# Patient Record
Sex: FEMALE | NOT HISPANIC OR LATINO | Employment: OTHER | URBAN - METROPOLITAN AREA
[De-identification: names, ages, dates, MRNs, and addresses within clinical notes are randomized per-mention and may not be internally consistent; named-entity substitution may affect disease eponyms.]

---

## 2024-05-30 ENCOUNTER — LAB REQUISITION (OUTPATIENT)
Dept: LAB | Facility: CLINIC | Age: 84
End: 2024-05-30

## 2024-05-30 ENCOUNTER — DOCUMENTATION ONLY (OUTPATIENT)
Dept: GERIATRICS | Facility: CLINIC | Age: 84
End: 2024-05-30
Payer: COMMERCIAL

## 2024-05-30 DIAGNOSIS — Z11.1 ENCOUNTER FOR SCREENING FOR RESPIRATORY TUBERCULOSIS: ICD-10-CM

## 2024-05-31 ENCOUNTER — TRANSITIONAL CARE UNIT VISIT (OUTPATIENT)
Dept: GERIATRICS | Facility: CLINIC | Age: 84
End: 2024-05-31
Payer: COMMERCIAL

## 2024-05-31 VITALS
SYSTOLIC BLOOD PRESSURE: 110 MMHG | HEIGHT: 64 IN | TEMPERATURE: 97.9 F | WEIGHT: 105.8 LBS | HEART RATE: 64 BPM | OXYGEN SATURATION: 92 % | DIASTOLIC BLOOD PRESSURE: 64 MMHG | RESPIRATION RATE: 16 BRPM | BODY MASS INDEX: 18.06 KG/M2

## 2024-05-31 DIAGNOSIS — Z96.649 STATUS POST HIP HEMIARTHROPLASTY: ICD-10-CM

## 2024-05-31 DIAGNOSIS — S72.002D CLOSED FRACTURE OF NECK OF LEFT FEMUR WITH ROUTINE HEALING, SUBSEQUENT ENCOUNTER: Primary | ICD-10-CM

## 2024-05-31 DIAGNOSIS — F41.9 ANXIETY: ICD-10-CM

## 2024-05-31 DIAGNOSIS — K59.01 SLOW TRANSIT CONSTIPATION: ICD-10-CM

## 2024-05-31 DIAGNOSIS — F17.200 TOBACCO DEPENDENCE: ICD-10-CM

## 2024-05-31 DIAGNOSIS — R53.81 PHYSICAL DECONDITIONING: ICD-10-CM
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
## 2024-05-31 DIAGNOSIS — R41.89 COGNITIVE IMPAIRMENT: ICD-10-CM

## 2024-05-31 DIAGNOSIS — I10 ESSENTIAL HYPERTENSION: ICD-10-CM

## 2024-05-31 DIAGNOSIS — S22.039D CLOSED FRACTURE OF THIRD THORACIC VERTEBRA WITH ROUTINE HEALING, UNSPECIFIED FRACTURE MORPHOLOGY, SUBSEQUENT ENCOUNTER: ICD-10-CM

## 2024-05-31 DIAGNOSIS — D62 ANEMIA DUE TO BLOOD LOSS, ACUTE: ICD-10-CM

## 2024-05-31 DIAGNOSIS — M80.00XD OSTEOPOROSIS WITH CURRENT PATHOLOGICAL FRACTURE WITH ROUTINE HEALING, UNSPECIFIED OSTEOPOROSIS TYPE, SUBSEQUENT ENCOUNTER: ICD-10-CM

## 2024-05-31 PROBLEM — F41.1 GAD (GENERALIZED ANXIETY DISORDER): Status: ACTIVE | Noted: 2024-05-25

## 2024-05-31 PROBLEM — S72.012A: Status: ACTIVE | Noted: 2024-05-25

## 2024-05-31 PROBLEM — Z85.828 HISTORY OF NONMELANOMA SKIN CANCER: Status: ACTIVE | Noted: 2022-08-30

## 2024-05-31 PROBLEM — K59.09 OTHER CONSTIPATION: Status: ACTIVE | Noted: 2023-08-27
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
## 2024-05-31 PROBLEM — R55 SYNCOPE AND COLLAPSE: Status: ACTIVE | Noted: 2023-08-27

## 2024-05-31 PROCEDURE — 36415 COLL VENOUS BLD VENIPUNCTURE: CPT | Performed by: NURSE PRACTITIONER

## 2024-05-31 PROCEDURE — 99310 SBSQ NF CARE HIGH MDM 45: CPT | Performed by: NURSE PRACTITIONER

## 2024-05-31 PROCEDURE — P9604 ONE-WAY ALLOW PRORATED TRIP: HCPCS | Performed by: NURSE PRACTITIONER

## 2024-05-31 PROCEDURE — 86481 TB AG RESPONSE T-CELL SUSP: CPT | Performed by: NURSE PRACTITIONER

## 2024-05-31 RX ORDER — MIRTAZAPINE 15 MG/1
1 TABLET, FILM COATED ORAL AT BEDTIME
COMMUNITY
Start: 2023-06-25

## 2024-05-31 RX ORDER — AMOXICILLIN 250 MG
1 CAPSULE ORAL 2 TIMES DAILY PRN
COMMUNITY
Start: 2024-05-29

## 2024-05-31 RX ORDER — NICOTINE 21 MG/24HR
1 PATCH, TRANSDERMAL 24 HOURS TRANSDERMAL EVERY 24 HOURS
COMMUNITY
Start: 2024-05-30

## 2024-05-31 RX ORDER — ACETAMINOPHEN 325 MG/1
650 TABLET ORAL 3 TIMES DAILY
COMMUNITY
Start: 2024-05-29

## 2024-05-31 RX ORDER — CLONIDINE HYDROCHLORIDE 0.1 MG/1
0.1 TABLET ORAL 2 TIMES DAILY
COMMUNITY
Start: 2024-05-30 | End: 2024-06-04

## 2024-05-31 RX ORDER — CITALOPRAM HYDROBROMIDE 20 MG/1
1 TABLET ORAL DAILY
COMMUNITY
Start: 2023-09-26

## 2024-05-31 RX ORDER — ENOXAPARIN SODIUM 100 MG/ML
40 INJECTION SUBCUTANEOUS EVERY 24 HOURS
COMMUNITY
Start: 2024-05-30 | End: 2024-06-14

## 2024-05-31 NOTE — PROGRESS NOTES
[facility] GERIATRICS    PRIMARY CARE PROVIDER AND CLINIC:  No primary care provider on file., No primary physician on file.  Chief Complaint   Patient presents with    Hospital F/U      [place] Medical Record Number:  [id number]  Place of Service where encounter took place:  [facility]  ([facility]) [[id number]]    Becky England  is a 84 year old  (1940), admitted to the above facility from  [facility]. Hospital stay 5/25/24 through 5/30/24..   HPI:    PMH significant for hypertension, dyslipidemia, anxiety, cognitive impairment, tobacco dependence, osteoporosis    Summary of recent hospitalization:  Patient was hospitalized at [facility] from 5/25/2024 to 5/30/2024 for femur fracture status left hemiarthroplasty on 5/26/2024.  Patient presented to the emergency department for evaluation after a fall.  Laboratory evaluation in the ED revealed essentially normal CBC and BMP.  CT head and cervical spine negative for acute findings, stable chronic superior endplate fracture at T3 with moderate anterior height loss, moderate to advanced disc degeneration throughout cervical spine, high-grade neural foraminal stenosis at C3-C4 on the left and low-grade elsewhere.  X-ray to left hip and pelvis revealed displaced subcapital femoral neck fracture of left hip.  Ortho was consulted and patient's status post left hip hemiarthroplasty on 5/26/2024.  ml. Okay to weight-bear as tolerated to left lower extremity, with posterior hip precautions.  Lovenox daily x 6 weeks for DVT prophylaxis.  With acute blood loss anemia secondary to surgery, hemoglobin 10.7 on 5/28/2024. Also with post op delirium resolved prior to discharge. Discharged to [facility] for physical rehabilitation and medical management.     Reviewed care everywhere including ER notes, H&P, consultant notes, labs and imaging today.    Today patient was seen for admission visit in the  TCU.  She is oriented x 3.  She lives in a [facility] with her .  She denies pain today.  She denies shortness of breath, chest pain, dizziness/lightheadedness, dysuria.  She tells me her last bowel movement was yesterday.  She reports that her appetite is back to normal.  We discussed what to expect in the TCU.    Reviewed facility EMR including medications, recent nursing progress notes, vital signs.  Discussed plan of care with nursing.    CODE STATUS/ADVANCE DIRECTIVES DISCUSSION:  DNR / DNI  ALLERGIES:   Allergies   Allergen Reactions    Ibandronic Acid Dizziness     PN: LW Reaction: dizzy    Lisinopril Cough     PN: LW Reaction: cough      PAST MEDICAL HISTORY: History reviewed. No pertinent past medical history.   PAST SURGICAL HISTORY:   has no past surgical history on file.  FAMILY HISTORY: family history is not on file.  SOCIAL HISTORY:     Patient's living condition: lives with spouse    Post Discharge Medication Reconciliation Status:   MED REC REQUIRED  Post Medication Reconciliation Status:  Discharge medications reconciled and changed, see notes/orders       Current Outpatient Medications   Medication Sig Dispense Refill    acetaminophen (TYLENOL) 325 MG tablet Take 650 mg by mouth every 6 hours      citalopram (CELEXA) 20 MG tablet Take 1 tablet by mouth daily      cloNIDine (CATAPRES) 0.1 MG tablet Take 0.1 mg by mouth 2 times daily HTN      enoxaparin ANTICOAGULANT (LOVENOX) 40 MG/0.4ML syringe Inject 40 mg Subcutaneous every 24 hours      mirtazapine (REMERON) 15 MG tablet Take 1 tablet by mouth at bedtime      nicotine (NICODERM CQ) 14 MG/24HR 24 hr patch Place 1 patch onto the skin every 24 hours Apply 1 patch transdermally every day shift for  Cigarette withdrawal symptoms Apply on dry, clean,  hairless skin      senna-docusate (SENOKOT-S/PERICOLACE) 8.6-50 MG tablet Take 1 tablet by mouth 2 times daily as needed for constipation       No current facility-administered medications for this  "visit.       ROS:  10 point ROS of systems including Constitutional, Eyes, Respiratory, Cardiovascular, Gastroenterology, Genitourinary, Integumentary, Musculoskeletal, Psychiatric were all negative except for pertinent positives noted in my HPI.    Vitals:  /64   Pulse 64   Temp 97.9  F (36.6  C)   Resp 16   Ht 1.626 m (5' 4\")   Wt 48 kg (105 lb 12.8 oz)   SpO2 92%   BMI 18.16 kg/m    Exam:  GENERAL APPEARANCE:  Alert, in NAD  HEENT: normocephalic, moist mucous membranes, nose without drainage or crusting  RESP:  respiratory effort normal, no respiratory distress, Lung sounds clear, patient is on RA  CV: auscultation of heart done, rate and rhythm regular.   ABDOMEN: + bowel sounds, soft, nontender, no grimacing or guarding with palpation.  M/S:  generalized LLE swelling   SKIN: dressing to left hip C/D/I; multiple other dressings to arms and legs also C/D/I  NEURO: cranial nerves 2-12 grossly intact and at patient's baseline; moves extremities freely  PSYCH: oriented x 3, affect and mood normal    Lab/Diagnostic data:  Labs done in SNF are in [facility]. Please refer to them using [employer]/Care Everywhere. and Recent labs in [facility] reviewed by me today.     ASSESSMENT/PLAN:  Left femoral neck fracture status post left hip hemiarthroplasty on 5/26/2024  Osteoporosis  Fall, subsequent encounter  Denies pain today  Plan: Continue Tylenol 650 mg q6h PRN for pain.  Continue enoxaparin 40 mg daily for DVT prophylaxis through 7/7/2024.  Weightbearing as tolerated to left lower extremity, posterior hip flexion past 90 degrees, no hip adduction beyond neutral and no hip internal rotation.  Continue wound care as ordered.  Follow-up with Ortho per recommendations. Follow up outpatient to discuss osteoporosis management.    Acute blood loss anemia  Secondary to surgery  Baseline hemoglobin around 13  Hemoglobin 10.7 on 5/28/2024  Plan: CBC 6/4.  Monitor for signs and symptoms of bleeding.    Essential " hypertension  SBP limited,  since admission, HR 62-78  Plan: Continue clonidine 0.1 mg twice daily, hold if SBP less than 120. Monitor BP.    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 Monitor     Anxiety  Plan: Continue citalopram 20 mg daily and mirtazapine 15 mg at bedtime- note that this dose can be stimulating, if issues sleeping move to AM.  Monitor mood and symptoms.  Consider ACP referral as needed.    Tobacco dependence  Plan: Continue nicotine patch daily.  Encourage smoking cessation.    Delirium, improved  Cognitive impairment   Per chart review  Oriented x 3 today  Plan: OCCUPATIONAL THERAPY to complete cognitive testing for safe discharge planning.  Nursing to assist with cares, meals, medication assistance, activities.    Slow transit constipation  Last bowel movement was yesterday  Plan: Continue senna S1 tab twice daily as needed.  Monitor bowels.    Physical deconditioning  Secondary to recent hospitalization, surgery, acute/ chronic medical conditions as above  Plan: Encourage participation in physical therapy/occupational therapy for strengthening and deconditioning. Discharge planning per their recommendation. Social work to assist with d/c planning.    Disclaimer: This note may contain text created using speech-recognition software and may contain unintended word substitutions.        Total time spent with patient visit at the skilled nursing facility was 45 minutes including patient visit, review of past records, medication reconciliation, order clarifications, discussion with nursing staff    Electronically signed by:  ADAM Davis CNP

## 2024-05-31 NOTE — LETTER
5/31/2024        RE: Becky England  [address]  [place] [facility] [facility] [address]        [facility] GERIATRICS    PRIMARY CARE PROVIDER AND CLINIC:  No primary care provider on file., No primary physician on file.  Chief Complaint   Patient presents with     Hospital F/U      [place] Medical Record Number:  [id number]  Place of Service where encounter took place:  [facility]  ([facility]) [[id number]]    Becky England  is a 84 year old  (1940), admitted to the above facility from  [facility]. Hospital stay 5/25/24 through 5/30/24..   HPI:    PMH significant for hypertension, dyslipidemia, anxiety, cognitive impairment, tobacco dependence, osteoporosis    Summary of recent hospitalization:  Patient was hospitalized at [facility] from 5/25/2024 to 5/30/2024 for femur fracture status left hemiarthroplasty on 5/26/2024.  Patient presented to the emergency department for evaluation after a fall.  Laboratory evaluation in the ED revealed essentially normal CBC and BMP.  CT head and cervical spine negative for acute findings, stable chronic superior endplate fracture at T3 with moderate anterior height loss, moderate to advanced disc degeneration throughout cervical spine, high-grade neural foraminal stenosis at C3-C4 on the left and low-grade elsewhere.  X-ray to left hip and pelvis revealed displaced subcapital femoral neck fracture of left hip.  Ortho was consulted and patient's status post left hip hemiarthroplasty on 5/26/2024.  ml. Okay to weight-bear as tolerated to left lower extremity, with posterior hip precautions.  Lovenox daily x 6 weeks for DVT prophylaxis.  With acute blood loss anemia secondary to surgery, hemoglobin 10.7 on 5/28/2024. Also with post op delirium resolved prior to discharge. [facility] for [facility] and medical management.     Reviewed care everywhere including ER notes, H&P,  consultant notes, labs and imaging today.    Today patient was seen for admission visit in the TCU.  She is oriented x 3.  [facility]  She denies pain today.  She denies shortness of breath, chest pain, dizziness/lightheadedness, dysuria.  She tells me her last bowel movement was yesterday.  She reports that her appetite is back to normal.  We discussed what to expect in the TCU.    Reviewed facility EMR including medications, recent nursing progress notes, vital signs.  Discussed plan of care with nursing.    CODE STATUS/ADVANCE DIRECTIVES DISCUSSION:  DNR / DNI  ALLERGIES:   Allergies   Allergen Reactions     Ibandronic Acid Dizziness     PN: LW Reaction: dizzy     Lisinopril Cough     PN: LW Reaction: cough      PAST MEDICAL HISTORY: History reviewed. No pertinent past medical history.   PAST SURGICAL HISTORY:   has no past surgical history on file.  FAMILY HISTORY: family history is not on file.  SOCIAL HISTORY:     Patient's living condition: lives with spouse    Post Discharge Medication Reconciliation Status:   MED REC REQUIRED  Post Medication Reconciliation Status:  Discharge medications reconciled and changed, see notes/orders       Current Outpatient Medications   Medication Sig Dispense Refill     acetaminophen (TYLENOL) 325 MG tablet Take 650 mg by mouth every 6 hours       citalopram (CELEXA) 20 MG tablet Take 1 tablet by mouth daily       cloNIDine (CATAPRES) 0.1 MG tablet Take 0.1 mg by mouth 2 times daily HTN       enoxaparin ANTICOAGULANT (LOVENOX) 40 MG/0.4ML syringe Inject 40 mg Subcutaneous every 24 hours       mirtazapine (REMERON) 15 MG tablet Take 1 tablet by mouth at bedtime       nicotine (NICODERM CQ) 14 MG/24HR 24 hr patch Place 1 patch onto the skin every 24 hours Apply 1 patch transdermally every day shift for  Cigarette withdrawal symptoms Apply on dry, clean,  hairless skin       senna-docusate (SENOKOT-S/PERICOLACE) 8.6-50 MG tablet Take 1 tablet by  "mouth 2 times daily as needed for constipation       No current facility-administered medications for this visit.       ROS:  10 point ROS of systems including Constitutional, Eyes, Respiratory, Cardiovascular, Gastroenterology, Genitourinary, Integumentary, Musculoskeletal, Psychiatric were all negative except for pertinent positives noted in my HPI.    Vitals:  /64   Pulse 64   Temp 97.9  F (36.6  C)   Resp 16   Ht 1.626 m (5' 4\")   Wt 48 kg (105 lb 12.8 oz)   SpO2 92%   BMI 18.16 kg/m    Exam:  GENERAL APPEARANCE:  Alert, in NAD  HEENT: normocephalic, moist mucous membranes, nose without drainage or crusting  RESP:  respiratory effort normal, no respiratory distress, Lung sounds clear, patient is on RA  CV: auscultation of heart done, rate and rhythm regular.   ABDOMEN: + bowel sounds, soft, nontender, no grimacing or guarding with palpation.  M/S:  generalized LLE swelling   SKIN: dressing to left hip C/D/I; multiple other dressings to arms and legs also C/D/I  NEURO: cranial nerves 2-12 grossly intact and at patient's baseline; moves extremities freely  PSYCH: oriented x 3, affect and mood normal    Lab/Diagnostic data:  Labs done in SNF are in [facility]. Please refer to them using [employer] [facility]/Care Everywhere. and Recent labs in [facility] reviewed by me today.     ASSESSMENT/PLAN:  Left femoral neck fracture status post left hip hemiarthroplasty on 5/26/2024  Osteoporosis  Fall, subsequent encounter  Denies pain today  Plan: Continue Tylenol 650 mg q6h PRN for pain.  Continue enoxaparin 40 mg daily for DVT prophylaxis through 7/7/2024.  Weightbearing as tolerated to left lower extremity, posterior hip flexion past 90 degrees, no hip adduction beyond neutral and no hip internal rotation.  Continue wound care as ordered.  Follow-up with Ortho per recommendations. Follow up outpatient to discuss osteoporosis management.    Acute blood loss anemia  Secondary to surgery  Baseline hemoglobin around " 13  Hemoglobin 10.7 on 5/28/2024  Plan: CBC 6/4.  Monitor for signs and symptoms of bleeding.    Essential hypertension  SBP limited,  since admission, HR 62-78  Plan: Continue clonidine 0.1 mg twice daily, hold if SBP less than 120. Monitor BP.    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 Monitor     Anxiety  Plan: Continue citalopram 20 mg daily and mirtazapine 15 mg at bedtime- note that this dose can be stimulating, if issues sleeping move to AM.  Monitor mood and symptoms.  Consider ACP referral as needed.    Tobacco dependence  Plan: Continue nicotine patch daily.  Encourage smoking cessation.    Delirium, improved  Cognitive impairment   Per chart review  Oriented x 3 today  Plan: OCCUPATIONAL THERAPY to complete cognitive testing for safe discharge planning.  Nursing to assist with cares, meals, medication assistance, activities.    Slow transit constipation  Last bowel movement was yesterday  Plan: Continue senna S1 tab twice daily as needed.  Monitor bowels.    Physical deconditioning  Secondary to recent hospitalization, surgery, acute/ chronic medical conditions as above  Plan: Encourage participation in physical therapy/occupational therapy for strengthening and deconditioning. Discharge planning per their recommendation. Social work to assist with d/c planning.    Disclaimer: This note may contain text created using speech-recognition software and may contain unintended word substitutions.        Total time spent with patient visit at the skilled nursing facility was 45 minutes including patient visit, review of past records, medication reconciliation, order clarifications, discussion with nursing staff    Electronically signed by:  ADAM Davis CNP                    Sincerely,        ADAM Davis CNP

## 2024-05-31 NOTE — PATIENT INSTRUCTIONS
Rj  Alberta ALEXANDER England  1940  1) CBC, BMP 6/4. Diagnosis: HTN, CBC  2) follow up with Dr. Thomas (ortho) in 2 weeks.   3) Change tylenol to 650 mg q6h PRN for pain  Rut Tinajero, APRN CNP on 5/31/2024 at 1:45 PM

## 2024-06-01 LAB
GAMMA INTERFERON BACKGROUND BLD IA-ACNC: 0.04 IU/ML
M TB IFN-G BLD-IMP: NEGATIVE
M TB IFN-G CD4+ BCKGRND COR BLD-ACNC: 2.05 IU/ML
MITOGEN IGNF BCKGRD COR BLD-ACNC: 0.02 IU/ML
MITOGEN IGNF BCKGRD COR BLD-ACNC: 0.04 IU/ML
QUANTIFERON MITOGEN: 2.09 IU/ML
QUANTIFERON NIL TUBE: 0.04 IU/ML
QUANTIFERON TB1 TUBE: 0.08 IU/ML
QUANTIFERON TB2 TUBE: 0.06

## 2024-06-03 NOTE — PROGRESS NOTES
[facility] GERIATRICS    Chief Complaint   Patient presents with    RECHECK     HPI:  Becky England is a 84 year old  (1940), who is being seen today for an episodic care visit at: [facility]  ([facility]) [[id number]].     PMH significant for hypertension, dyslipidemia, anxiety, cognitive impairment, tobacco dependence, osteoporosis     Summary of recent hospitalization:  Patient was hospitalized at [facility] from 5/25/2024 to 5/30/2024 for femur fracture status left hemiarthroplasty on 5/26/2024.  Patient presented to the emergency department for evaluation after a fall.  Laboratory evaluation in the ED revealed essentially normal CBC and BMP.  CT head and cervical spine negative for acute findings, stable chronic superior endplate fracture at T3 with moderate anterior height loss, moderate to advanced disc degeneration throughout cervical spine, high-grade neural foraminal stenosis at C3-C4 on the left and low-grade elsewhere.  X-ray to left hip and pelvis revealed displaced subcapital femoral neck fracture of left hip.  Ortho was consulted and patient's status post left hip hemiarthroplasty on 5/26/2024.  ml. Okay to weight-bear as tolerated to left lower extremity, with posterior hip precautions.  Lovenox daily x 6 weeks for DVT prophylaxis.  With acute blood loss anemia secondary to surgery, hemoglobin 10.7 on 5/28/2024. Also with post op delirium resolved prior to discharge. Discharged to TCU for physical rehabilitation and medical management.     Today patient is seen for routine follow-up in the TCU.  She is resting in bed.  She denies any pain, reports therapy is going well.  Per therapy notes she is max assist for lower body dressing and hygiene, mod assist for upper body dressing, set up for eating and grooming/hygiene. She is ambulating 125 feet with FWW, transfers min A with FWW.  She does report occasional lightheadedness/dizziness, she  "has been having softer blood pressures at times.  She tells me that her appetite is down compared to normal.  She is on house supplement twice a day.  She denies dysuria/trouble urinating, SOB, CP.  Reports bowels are moving regularly.    Reviewed facility EMR including medications, recent nursing progress notes, vital signs.  Discussed plan of care with nursing.    Allergies, and PMH/PSH reviewed in [facility] today.  REVIEW OF SYSTEMS:  10 point ROS of systems including Constitutional, Eyes, Respiratory, Cardiovascular, Gastroenterology, Genitourinary, Integumentary, Musculoskeletal, Psychiatric were all negative except for pertinent positives noted in my HPI.    Objective:   /75   Pulse 75   Temp 98.2  F (36.8  C)   Resp 18   Ht 1.626 m (5' 4\")   Wt 47.6 kg (105 lb)   SpO2 97%   BMI 18.02 kg/m    GENERAL APPEARANCE:  Alert,frail, in NAD  HEENT: normocephalic, moist mucous membranes, nose without drainage or crusting  RESP:  respiratory effort normal, no respiratory distress, Lung sounds clear, patient is on RA  CV: auscultation of heart done, rate and rhythm regular.   ABDOMEN: + bowel sounds, soft, nontender, no grimacing or guarding with palpation.  M/S: no lower extremity edema  SKIN: dressings to bilateral shins and bilateral upper extremities are C/D/I  NEURO: cranial nerves 2-12 grossly intact and at patient's baseline; moves extremities freely  PSYCH:  affect and mood normal      Labs done in SNF are in [facility]. Please refer to them using [facility]/Care Everywhere. and Recent labs in [facility] reviewed by me today.     Assessment/Plan:  Left femoral neck fracture status post left hip hemiarthroplasty on 5/26/2024  Osteoporosis  Fall, subsequent encounter  Denies pain  Plan: Continue Tylenol 650 mg q6h PRN for pain.  Continue enoxaparin 40 mg daily for DVT prophylaxis through 7/7/2024. CBC weekly while on enoxaparin- ordered for 6/5. Weightbearing as tolerated to left lower extremity, posterior hip " flexion past 90 degrees, no hip adduction beyond neutral and no hip internal rotation.  Continue wound care as ordered.  Follow-up with Ortho in 2 weeks. Follow up outpatient to discuss osteoporosis management.     Acute blood loss anemia  Secondary to surgery  Baseline hemoglobin around 13  Hemoglobin 10.7 on 5/28/2024  Plan: CBC reordered for 6/5.  Monitor for signs and symptoms of bleeding.     Essential hypertension  SBP ; HR 72-89- is having dizziness/lightheadedness  Plan: Reduce clonidine to 0.1 mg daily Hold if SBP<120 and clonidine 0.1 mg daily PRN for SBP>170. Start losartan 25 mg daily. Monitor BP.     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 Monitor      Anxiety  Plan: Continue citalopram 20 mg daily and mirtazapine 15 mg at bedtime- note that this dose can be stimulating, if issues sleeping move to AM.  Monitor mood and symptoms.  Consider ACP referral as needed.     Tobacco dependence  Plan: Continue nicotine patch daily.  Encourage smoking cessation.     Delirium, improved  Dementia level cognitive impairment   Slums 16/30 in the TCU  Plan: OCCUPATIONAL THERAPY to complete cognitive testing for safe discharge planning.  Nursing to assist with cares, meals, medication assistance, activities.     Slow transit constipation  Bowels are moving regularly  Plan: Continue senna S1 tab twice daily as needed.  Monitor bowels.     Physical deconditioning  Secondary to recent hospitalization, surgery, acute/ chronic medical conditions as above  Patient continues to work with therapy  Plan: Encourage participation in physical therapy/occupational therapy for strengthening and deconditioning. Discharge planning per their recommendation. Social work to assist with d/c planning.      MED REC REQUIRED  Post Medication Reconciliation Status:  Medication reconciliation  previously completed during another office visit    Disclaimer: This note may contain text created using speech-recognition software and may contain unintended word substitutions.       Electronically signed by: ADAM Davis CNP

## 2024-06-04 ENCOUNTER — LAB REQUISITION (OUTPATIENT)
Dept: LAB | Facility: CLINIC | Age: 84
End: 2024-06-04

## 2024-06-04 ENCOUNTER — TRANSITIONAL CARE UNIT VISIT (OUTPATIENT)
Dept: GERIATRICS | Facility: CLINIC | Age: 84
End: 2024-06-04
Payer: COMMERCIAL

## 2024-06-04 VITALS
HEIGHT: 64 IN | RESPIRATION RATE: 18 BRPM | DIASTOLIC BLOOD PRESSURE: 75 MMHG | WEIGHT: 105 LBS | TEMPERATURE: 98.2 F | SYSTOLIC BLOOD PRESSURE: 121 MMHG | OXYGEN SATURATION: 97 % | HEART RATE: 75 BPM | BODY MASS INDEX: 17.93 KG/M2

## 2024-06-04 DIAGNOSIS — M80.00XD OSTEOPOROSIS WITH CURRENT PATHOLOGICAL FRACTURE WITH ROUTINE HEALING, UNSPECIFIED OSTEOPOROSIS TYPE, SUBSEQUENT ENCOUNTER: ICD-10-CM

## 2024-06-04 DIAGNOSIS — I10 ESSENTIAL (PRIMARY) HYPERTENSION: ICD-10-CM

## 2024-06-04 DIAGNOSIS — Z96.649 STATUS POST HIP HEMIARTHROPLASTY: ICD-10-CM

## 2024-06-04 DIAGNOSIS — S22.039D CLOSED FRACTURE OF THIRD THORACIC VERTEBRA WITH ROUTINE HEALING, UNSPECIFIED FRACTURE MORPHOLOGY, SUBSEQUENT ENCOUNTER: ICD-10-CM
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
## 2024-06-04 DIAGNOSIS — F17.200 TOBACCO DEPENDENCE: ICD-10-CM

## 2024-06-04 DIAGNOSIS — I10 ESSENTIAL HYPERTENSION: ICD-10-CM

## 2024-06-04 DIAGNOSIS — D62 ANEMIA DUE TO BLOOD LOSS, ACUTE: ICD-10-CM

## 2024-06-04 DIAGNOSIS — R41.89 COGNITIVE IMPAIRMENT: ICD-10-CM

## 2024-06-04 DIAGNOSIS — S72.002D CLOSED FRACTURE OF NECK OF LEFT FEMUR WITH ROUTINE HEALING, SUBSEQUENT ENCOUNTER: Primary | ICD-10-CM

## 2024-06-04 DIAGNOSIS — R53.81 PHYSICAL DECONDITIONING: ICD-10-CM

## 2024-06-04 DIAGNOSIS — F41.9 ANXIETY: ICD-10-CM

## 2024-06-04 DIAGNOSIS — D64.9 ANEMIA, UNSPECIFIED: ICD-10-CM

## 2024-06-04 PROCEDURE — 99309 SBSQ NF CARE MODERATE MDM 30: CPT | Performed by: NURSE PRACTITIONER

## 2024-06-04 RX ORDER — LOSARTAN POTASSIUM 25 MG/1
25 TABLET ORAL DAILY
Status: SHIPPED
Start: 2024-06-04 | End: 2024-06-11

## 2024-06-04 RX ORDER — CLONIDINE HYDROCHLORIDE 0.1 MG/1
0.1 TABLET ORAL DAILY
Status: SHIPPED
Start: 2024-06-04 | End: 2024-06-11

## 2024-06-04 NOTE — PATIENT INSTRUCTIONS
Orders  Alberta ALEXANDER Yangty Tomás  1940  1) CBC, BMP 6/5. Diagnosis: anemia, HTN  2) Reduce clonidine to 0.1 mg daily Hold if SBP<120 and clonidine 0.1 mg daily PRN for SBP>170. Diagnosis: HTN  3) Start losartan 25 mg daily. Diagnosis: HTN Hold if SBP<110.  ADAM Davis CNP on 6/4/2024 at 9:32 AM

## 2024-06-04 NOTE — LETTER
6/4/2024        RE: Becky England  [address]  [facility] [address]        [facility] GERIATRICS    Chief Complaint   Patient presents with     RECHECK     HPI:  Becky England is a 84 year old  (1940), who is being seen today for an episodic care visit at: [facility]  ([facility]) [[id number]].     PMH significant for hypertension, dyslipidemia, anxiety, cognitive impairment, tobacco dependence, osteoporosis     Summary of recent hospitalization:  Patient was hospitalized at [facility] from 5/25/2024 to 5/30/2024 for femur fracture status left hemiarthroplasty on 5/26/2024.  Patient presented to the emergency department for evaluation after a fall.  Laboratory evaluation in the ED revealed essentially normal CBC and BMP.  CT head and cervical spine negative for acute findings, stable chronic superior endplate fracture at T3 with moderate anterior height loss, moderate to advanced disc degeneration throughout cervical spine, high-grade neural foraminal stenosis at C3-C4 on the left and low-grade elsewhere.  X-ray to left hip and pelvis revealed displaced subcapital femoral neck fracture of left hip.  Ortho was consulted and patient's status post left hip hemiarthroplasty on 5/26/2024.  ml. Okay to weight-bear as tolerated to left lower extremity, with posterior hip precautions.  Lovenox daily x 6 weeks for DVT prophylaxis.  With acute blood loss anemia secondary to surgery, hemoglobin 10.7 on 5/28/2024. Also with post op delirium resolved prior to discharge. Discharged to TCU for physical rehabilitation and medical management.     Today patient is seen for routine follow-up in the TCU.  She is resting in bed.  She denies any pain, reports therapy is going well.  Per therapy notes she is max assist for lower body dressing and hygiene, mod assist for upper body dressing, set up for eating and grooming/hygiene. She is ambulating 125 feet  "with FWW, transfers min A with FWW.  She does report occasional lightheadedness/dizziness, she has been having softer blood pressures at times.  She tells me that her appetite is down compared to normal.  She is on house supplement twice a day.  She denies dysuria/trouble urinating, SOB, CP.  Reports bowels are moving regularly.    Reviewed facility EMR including medications, recent nursing progress notes, vital signs.  Discussed plan of care with nursing.    Allergies, and PMH/PSH reviewed in [facility] today.  REVIEW OF SYSTEMS:  10 point ROS of systems including Constitutional, Eyes, Respiratory, Cardiovascular, Gastroenterology, Genitourinary, Integumentary, Musculoskeletal, Psychiatric were all negative except for pertinent positives noted in my HPI.    Objective:   /75   Pulse 75   Temp 98.2  F (36.8  C)   Resp 18   Ht 1.626 m (5' 4\")   Wt 47.6 kg (105 lb)   SpO2 97%   BMI 18.02 kg/m    GENERAL APPEARANCE:  Alert,frail, in NAD  HEENT: normocephalic, moist mucous membranes, nose without drainage or crusting  RESP:  respiratory effort normal, no respiratory distress, Lung sounds clear, patient is on RA  CV: auscultation of heart done, rate and rhythm regular.   ABDOMEN: + bowel sounds, soft, nontender, no grimacing or guarding with palpation.  M/S: no lower extremity edema  SKIN: dressings to bilateral shins and bilateral upper extremities are C/D/I  NEURO: cranial nerves 2-12 grossly intact and at patient's baseline; moves extremities freely  PSYCH:  affect and mood normal      [facility]. Please refer to them using [employer]/Care Everywhere. and Recent labs in [facility] reviewed by me today.     Assessment/Plan:  Left femoral neck fracture status post left hip hemiarthroplasty on 5/26/2024  Osteoporosis  Fall, subsequent encounter  Denies pain  Plan: Continue Tylenol 650 mg q6h PRN for pain.  Continue enoxaparin 40 mg daily for DVT prophylaxis through 7/7/2024. CBC weekly while on " enoxaparin- ordered for 6/5. Weightbearing as tolerated to left lower extremity, posterior hip flexion past 90 degrees, no hip adduction beyond neutral and no hip internal rotation.  Continue wound care as ordered.  Follow-up with Ortho in 2 weeks. Follow up outpatient to discuss osteoporosis management.     Acute blood loss anemia  Secondary to surgery  Baseline hemoglobin around 13  Hemoglobin 10.7 on 5/28/2024  Plan: CBC reordered for 6/5.  Monitor for signs and symptoms of bleeding.     Essential hypertension  SBP ; HR 72-89- is having dizziness/lightheadedness  Plan: Reduce clonidine to 0.1 mg daily Hold if SBP<120 and clonidine 0.1 mg daily PRN for SBP>170. Start losartan 25 mg daily. Monitor BP.     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 Monitor      Anxiety  Plan: Continue citalopram 20 mg daily and mirtazapine 15 mg at bedtime- note that this dose can be stimulating, if issues sleeping move to AM.  Monitor mood and symptoms.  Consider ACP referral as needed.     Tobacco dependence  Plan: Continue nicotine patch daily.  Encourage smoking cessation.     Delirium, improved  Dementia level cognitive impairment   Slums 16/30 in the TCU  Plan: OCCUPATIONAL THERAPY to complete cognitive testing for safe discharge planning.  Nursing to assist with cares, meals, medication assistance, activities.     Slow transit constipation  Bowels are moving regularly  Plan: Continue senna S1 tab twice daily as needed.  Monitor bowels.     Physical deconditioning  Secondary to recent hospitalization, surgery, acute/ chronic medical conditions as above  Patient continues to work with therapy  Plan: Encourage participation in physical therapy/occupational therapy for strengthening and deconditioning. Discharge planning per their recommendation. Social work to assist with d/c  planning.      MED REC REQUIRED  Post Medication Reconciliation Status:  Medication reconciliation previously completed during another office visit    Disclaimer: This note may contain text created using speech-recognition software and may contain unintended word substitutions.       Electronically signed by: ADAM Davis CNP         Sincerely,        ADAM Davis CNP

## 2024-06-05 LAB
ANION GAP SERPL CALCULATED.3IONS-SCNC: 11 MMOL/L (ref 7–15)
BUN SERPL-MCNC: 26.7 MG/DL (ref 8–23)
CALCIUM SERPL-MCNC: 8.9 MG/DL (ref 8.8–10.2)
CHLORIDE SERPL-SCNC: 105 MMOL/L (ref 98–107)
CREAT SERPL-MCNC: 0.81 MG/DL (ref 0.51–0.95)
DEPRECATED HCO3 PLAS-SCNC: 26 MMOL/L (ref 22–29)
EGFRCR SERPLBLD CKD-EPI 2021: 70 ML/MIN/1.73M2
ERYTHROCYTE [DISTWIDTH] IN BLOOD BY AUTOMATED COUNT: 13.3 % (ref 10–15)
GLUCOSE SERPL-MCNC: 89 MG/DL (ref 70–99)
HCT VFR BLD AUTO: 35.2 % (ref 35–47)
HGB BLD-MCNC: 10.8 G/DL (ref 11.7–15.7)
MCH RBC QN AUTO: 27.6 PG (ref 26.5–33)
MCHC RBC AUTO-ENTMCNC: 30.7 G/DL (ref 31.5–36.5)
MCV RBC AUTO: 90 FL (ref 78–100)
PLATELET # BLD AUTO: 290 10E3/UL (ref 150–450)
POTASSIUM SERPL-SCNC: 4.2 MMOL/L (ref 3.4–5.3)
RBC # BLD AUTO: 3.92 10E6/UL (ref 3.8–5.2)
SODIUM SERPL-SCNC: 142 MMOL/L (ref 135–145)
WBC # BLD AUTO: 4.8 10E3/UL (ref 4–11)

## 2024-06-05 PROCEDURE — P9604 ONE-WAY ALLOW PRORATED TRIP: HCPCS | Performed by: NURSE PRACTITIONER

## 2024-06-05 PROCEDURE — 80048 BASIC METABOLIC PNL TOTAL CA: CPT | Performed by: NURSE PRACTITIONER

## 2024-06-05 PROCEDURE — 85027 COMPLETE CBC AUTOMATED: CPT | Performed by: NURSE PRACTITIONER

## 2024-06-05 PROCEDURE — 36415 COLL VENOUS BLD VENIPUNCTURE: CPT | Performed by: NURSE PRACTITIONER

## 2024-06-07 ENCOUNTER — TRANSITIONAL CARE UNIT VISIT (OUTPATIENT)
Dept: GERIATRICS | Facility: CLINIC | Age: 84
End: 2024-06-07
Payer: COMMERCIAL

## 2024-06-07 VITALS
TEMPERATURE: 97.6 F | OXYGEN SATURATION: 97 % | DIASTOLIC BLOOD PRESSURE: 75 MMHG | SYSTOLIC BLOOD PRESSURE: 123 MMHG | RESPIRATION RATE: 18 BRPM | HEIGHT: 67 IN | WEIGHT: 103 LBS | HEART RATE: 80 BPM | BODY MASS INDEX: 16.17 KG/M2

## 2024-06-07 DIAGNOSIS — I10 ESSENTIAL HYPERTENSION: Primary | ICD-10-CM

## 2024-06-07 DIAGNOSIS — K59.01 SLOW TRANSIT CONSTIPATION: ICD-10-CM

## 2024-06-07 PROCEDURE — 99309 SBSQ NF CARE MODERATE MDM 30: CPT | Performed by: NURSE PRACTITIONER

## 2024-06-07 RX ORDER — POLYETHYLENE GLYCOL 3350 17 G/17G
17 POWDER, FOR SOLUTION ORAL DAILY
Status: SHIPPED
Start: 2024-06-07 | End: 2024-06-19

## 2024-06-07 NOTE — PATIENT INSTRUCTIONS
Orders  [facility]berta S Sherri Strongcaden  1940  1) Administer miralax 17 gram now and senna s 2 tabs x1 dose now. Diagnosis: constipation  2) Start senna s 1 tab BID and continue 1 tab BID PRN for constipation  3) Start miralax 17 gram daily PRN for constipation  4) If no BM tomorrow administer MARISSA for bisacodyl suppository. dX: constipation  ADAM Davis CNP on 6/7/2024 at 12:22 PM

## 2024-06-07 NOTE — PROGRESS NOTES
"[facility] GERIATRICS    Chief Complaint   Patient presents with    Nursing Home Acute     HPI:  Becky England is a 84 year old  (1940), who is being seen today for an episodic care visit at: [facility]  ([facility]) [[id number]].     Today's concern is:   1. Essential hypertension    2. Slow transit constipation      Patient was seen today for episodic follow up in the TCU. Have been tapering clonidine in the TCU due to hypotension and started on losartan instead. Patient denies dizziness/lightheadedness. She denies SOB, CP. Denies hip pain. Denies dysuria. Bowels moving infrequently- patient unsure when she last had BM. Per nursing documentation last BM 4 days ago.       Allergies, and PMH/PSH reviewed in EPIC today.  REVIEW OF SYSTEMS:  4 point ROS including Respiratory, CV, GI and , other than that noted in the HPI,  is negative    Objective:   /75   Pulse 80   Temp 97.6  F (36.4  C)   Resp 18   Ht 1.702 m (5' 7\")   Wt 46.7 kg (103 lb)   SpO2 97%   BMI 16.13 kg/m    GENERAL APPEARANCE:  Alert, in NAD  HEENT: normocephalic, moist mucous membranes, nose without drainage or crusting  RESP:  respiratory effort normal, no respiratory distress, patient is on RA  PSYCH:  affect and mood normal     Labs done in SNF are in [facility]. Please refer to them using [employer]/Care Everywhere. and Recent labs in [facility] reviewed by me today.     Assessment/Plan:  Essential hypertension  Clonidine was reduced to once daily and PRN on 6/4 and has been receiving it once daily- has not used any PRN doses.   -142 since 6/4; HR 76-85- no reports of dizziness/lightheadedness today  Plan: Continue clonidine 0.1 mg daily and Hold if SBP<120 and clonidine 0.1 mg daily PRN for SBP>170. Continue losartan 25 mg daily. Monitor BP. Plan to consider discontinuing scheduled clonidine next week and increase losartan at that time.    Slow transit constipation  Last BM 4 days ago  Plan: Administer " miralax 17 gram now and senna s 2 tabs x1 dose now. Start senna s 1 tab BID and miralax daily PRN. Monitor bowels closely. If no BM tomorrow administer MARISSA for bisacodyl suppository.    MED REC REQUIRED  Post Medication Reconciliation Status:  Medication reconciliation previously completed during another office visit    Disclaimer: This note may contain text created using speech-recognition software and may contain unintended word substitutions.      Electronically signed by: ADAM Davis CNP

## 2024-06-07 NOTE — LETTER
" 6/7/2024      Becky England  [address]  [facility] [address]        [facility] GERIATRICS    Chief Complaint   Patient presents with     Nursing Home Acute     HPI:  Becky England is a 84 year old  (1940), who is being seen today for an episodic care visit at: [facility]  ([facility]) [[id number]].     Today's concern is:   1. Essential hypertension    2. Slow transit constipation      Patient was seen today for episodic follow up in the TCU. Have been tapering clonidine in the TCU due to hypotension and started on losartan instead. Patient denies dizziness/lightheadedness. She denies SOB, CP. Denies hip pain. Denies dysuria. Bowels moving infrequently- patient unsure when she last had BM. Per nursing documentation last BM 4 days ago.       Allergies, and PMH/PSH reviewed in [facility] today.  REVIEW OF SYSTEMS:  4 point ROS including Respiratory, CV, GI and , other than that noted in the HPI,  is negative    Objective:   /75   Pulse 80   Temp 97.6  F (36.4  C)   Resp 18   Ht 1.702 m (5' 7\")   Wt 46.7 kg (103 lb)   SpO2 97%   BMI 16.13 kg/m    GENERAL APPEARANCE:  Alert, in NAD  HEENT: normocephalic, moist mucous membranes, nose without drainage or crusting  RESP:  respiratory effort normal, no respiratory distress, patient is on RA  PSYCH:  affect and mood normal     Labs done in SNF are in [facility]. Please refer to them using [employer]/Care Everywhere. and Recent labs in [facility] reviewed by me today.     Assessment/Plan:  Essential hypertension  Clonidine was reduced to once daily and PRN on 6/4 and has been receiving it once daily- has not used any PRN doses.   -142 since 6/4; HR 76-85- no reports of dizziness/lightheadedness today  Plan: Continue clonidine 0.1 mg daily and Hold if SBP<120 and clonidine 0.1 mg daily PRN for SBP>170. Continue losartan 25 mg daily. Monitor BP. Plan to consider discontinuing scheduled clonidine next week and increase losartan at " that time.    Slow transit constipation  Last BM 4 days ago  Plan: Administer miralax 17 gram now and senna s 2 tabs x1 dose now. Start senna s 1 tab BID and miralax daily PRN. Monitor bowels closely. If no BM tomorrow administer MARISSA for bisacodyl suppository.    MED REC REQUIRED  Post Medication Reconciliation Status:  Medication reconciliation previously completed during another office visit    Disclaimer: This note may contain text created using speech-recognition software and may contain unintended word substitutions.      Electronically signed by: ADAM Davis CNP         Sincerely,        ADAM Davis CNP

## 2024-06-11 ENCOUNTER — TRANSITIONAL CARE UNIT VISIT (OUTPATIENT)
Dept: GERIATRICS | Facility: CLINIC | Age: 84
End: 2024-06-11
Payer: COMMERCIAL

## 2024-06-11 ENCOUNTER — LAB REQUISITION (OUTPATIENT)
Dept: LAB | Facility: CLINIC | Age: 84
End: 2024-06-11
Payer: COMMERCIAL

## 2024-06-11 VITALS
HEIGHT: 67 IN | OXYGEN SATURATION: 98 % | BODY MASS INDEX: 15.79 KG/M2 | RESPIRATION RATE: 18 BRPM | WEIGHT: 100.6 LBS | TEMPERATURE: 98.4 F | HEART RATE: 76 BPM | SYSTOLIC BLOOD PRESSURE: 154 MMHG | DIASTOLIC BLOOD PRESSURE: 85 MMHG

## 2024-06-11 DIAGNOSIS — M80.00XD OSTEOPOROSIS WITH CURRENT PATHOLOGICAL FRACTURE WITH ROUTINE HEALING, UNSPECIFIED OSTEOPOROSIS TYPE, SUBSEQUENT ENCOUNTER: ICD-10-CM

## 2024-06-11 DIAGNOSIS — Z96.649 STATUS POST HIP HEMIARTHROPLASTY: ICD-10-CM

## 2024-06-11 DIAGNOSIS — S22.039D CLOSED FRACTURE OF THIRD THORACIC VERTEBRA WITH ROUTINE HEALING, UNSPECIFIED FRACTURE MORPHOLOGY, SUBSEQUENT ENCOUNTER: ICD-10-CM
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
## 2024-06-11 DIAGNOSIS — D64.9 ANEMIA, UNSPECIFIED: ICD-10-CM

## 2024-06-11 DIAGNOSIS — F41.9 ANXIETY: ICD-10-CM

## 2024-06-11 DIAGNOSIS — R41.89 COGNITIVE IMPAIRMENT: ICD-10-CM

## 2024-06-11 DIAGNOSIS — D62 ANEMIA DUE TO BLOOD LOSS, ACUTE: ICD-10-CM

## 2024-06-11 DIAGNOSIS — S72.002D CLOSED FRACTURE OF NECK OF LEFT FEMUR WITH ROUTINE HEALING, SUBSEQUENT ENCOUNTER: Primary | ICD-10-CM

## 2024-06-11 DIAGNOSIS — R53.81 PHYSICAL DECONDITIONING: ICD-10-CM

## 2024-06-11 DIAGNOSIS — F17.200 TOBACCO DEPENDENCE: ICD-10-CM

## 2024-06-11 DIAGNOSIS — I10 ESSENTIAL HYPERTENSION: ICD-10-CM

## 2024-06-11 PROCEDURE — 99309 SBSQ NF CARE MODERATE MDM 30: CPT | Performed by: NURSE PRACTITIONER

## 2024-06-11 RX ORDER — CLONIDINE HYDROCHLORIDE 0.1 MG/1
TABLET ORAL
Status: SHIPPED
Start: 2024-06-11 | End: 2024-06-19

## 2024-06-11 RX ORDER — LOSARTAN POTASSIUM 25 MG/1
50 TABLET ORAL DAILY
Status: SHIPPED
Start: 2024-06-11 | End: 2024-06-19

## 2024-06-11 NOTE — PATIENT INSTRUCTIONS
Orders  [facility] Sherri Tomás  1940  1) CBC 6/12. Diagnosis: anemia  2) CBC, BMP 6/19. Diagnosis: HTN, anemia  3)  Increase losartan to 50 mg daily. Hold if SBP<110. Diagnosis: HTN  4)  Discontinue scheduled clonidine and continue clonidine 0.1 mg daily PRN for SBP>170. ADAM Davis CNP on 6/11/2024 at 9:02 AM

## 2024-06-11 NOTE — LETTER
6/11/2024      Becky England  [address]  [place] [address]        [facility] GERIATRICS    Chief Complaint   Patient presents with     RECHECK     HPI:  Becky England is a 84 year old  (1940), who is being seen today for an episodic care visit at: [facility]  ([facility]) [[id number]].     PMH significant for hypertension, dyslipidemia, anxiety, cognitive impairment, tobacco dependence, osteoporosis     Summary of recent hospitalization:  Patient was hospitalized at [facility] from 5/25/2024 to 5/30/2024 for femur fracture status left hemiarthroplasty on 5/26/2024.  Patient presented to the emergency department for evaluation after a fall.  Laboratory evaluation in the ED revealed essentially normal CBC and BMP.  CT head and cervical spine negative for acute findings, stable chronic superior endplate fracture at T3 with moderate anterior height loss, moderate to advanced disc degeneration throughout cervical spine, high-grade neural foraminal stenosis at C3-C4 on the left and low-grade elsewhere.  X-ray to left hip and pelvis revealed displaced subcapital femoral neck fracture of left hip.  Ortho was consulted and patient's status post left hip hemiarthroplasty on 5/26/2024.  ml. Okay to weight-bear as tolerated to left lower extremity, with posterior hip precautions.  Lovenox daily x 6 weeks for DVT prophylaxis.  With acute blood loss anemia secondary to surgery, hemoglobin 10.7 on 5/28/2024. Also with post op delirium resolved prior to discharge. Discharged to TCU for physical rehabilitation and medical management.    Today patient was seen for routine follow-up in the TCU.  Have been adjusting patient's blood pressure medications, as on admission patient on clonidine and it was causing soft Bps.  Patient denies any pain today.  She denies any shortness of breath, chest pain, dizziness/lightheadedness, dysuria/trouble urinating.  Last  bowel movement was 2 days ago per nursing documentation.  Patient continues to work with therapy.    Reviewed facility EMR including medications, recent nursing progress notes, vital signs.  Discussed plan of care with nursing.    Allergies, and PMH/PSH reviewed in EPIC today.  REVIEW OF SYSTEMS:  7 point ROS of systems including Constitutional, Respiratory, Cardiovascular, Gastroenterology, Genitourinary, Musculoskeletal, Psychiatric were all negative except for pertinent positives noted in my HPI.    Objective:   BP (!) 154/85   Pulse 76   Temp 98.4  F (36.9  C)   Resp 18   Wt 45.6 kg (100 lb 9.6 oz)   SpO2 98%   BMI 15.76 kg/m    GENERAL APPEARANCE:  Alert, in NAD  HEENT: normocephalic, moist mucous membranes, nose without drainage or crusting  RESP:  respiratory effort normal, no respiratory distress, Lung sounds clear, patient is on RA  CV: auscultation of heart done, rate and rhythm regular.   ABDOMEN: + bowel sounds, soft, nontender, no grimacing or guarding with palpation.  M/S:  no lower extremity edema  NEURO: cranial nerves 2-12 grossly intact and at patient's baseline; moves extremities freely  PSYCH: affect and mood normal      Labs done in SNF are in [facility]. Please refer to them using [employer]/Care Everywhere. and Recent labs in EPIC reviewed by me today.     Assessment/Plan:  Left femoral neck fracture status post left hip hemiarthroplasty on 5/26/2024  Osteoporosis  Fall, subsequent encounter  Patient denies any pain today  Plan: Continue Tylenol 650 mg q6h PRN for pain.  Continue enoxaparin 40 mg daily for DVT prophylaxis through 7/7/2024. CBC weekly while on enoxaparin- ordered for 6/12. Weightbearing as tolerated to left lower extremity, posterior hip flexion past 90 degrees, no hip adduction beyond neutral and no hip internal rotation.  Continue wound care as ordered.  Follow-up with Ortho as scheduled for 6/14. Follow up outpatient to discuss osteoporosis management.     Acute blood  loss anemia  Secondary to surgery  Baseline hemoglobin around 13  Hemoglobin 10.8 on 6/5/2024  Plan: CBC reordered for 6/12 and 6/19 while on enoxaparin.  Monitor for signs and symptoms of bleeding.     Essential hypertension  Clonidine was reduced to once daily and PRN on 6/4 and has been receiving it once daily- has not used any PRN doses.   Started on losartan 6/4  SBP  recently- having significant drops in BP after administration of clonidine.- denies lightheadedness/ dizziness  Plan: Increase losartan to 50 mg daily. Discontinue scheduled clonidine  and continue clonidine 0.1 mg daily PRN for SBP>170. BMP 6/19. Monitor BP.     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 Monitor      Anxiety  Plan: Continue citalopram 20 mg daily and mirtazapine 15 mg at bedtime- note that this dose can be stimulating, if issues sleeping move to AM.  Monitor mood and symptoms.  Consider ACP referral as needed.     Tobacco dependence  Plan: Continue nicotine patch daily.  Encourage smoking cessation.     Delirium, improved  Dementia level cognitive impairment   Slums 16/30, CPT 4.3/5.6 in the TCU  Plan: Based on cognitive testing patient is on the border of requiring 24-hour supervision for problem-solving and managing changes in routine, recommend daily assistant with self-cares, meal prep, housekeeping, medication/financial management and community mobility.  Nursing to assist with cares, meals, medication assistance, activities.     Slow transit constipation  Last BM 2 days ago, reportedly was having diarrhea recently and requested meds to PRN  Plan: Continue senna s 1 tab BID RPN and miralax daily PRN. Monitor bowels closely.     Physical deconditioning  Secondary to recent hospitalization, surgery, acute/ chronic medical conditions as above  Patient continues to  work with therapy  Plan: Encourage participation in physical therapy/occupational therapy for strengthening and deconditioning. Discharge planning per their recommendation. Social work to assist with d/c planning.    MED REC REQUIRED  Post Medication Reconciliation Status:  Medication reconciliation previously completed during another office visit      Disclaimer: This note may contain text created using speech-recognition software and may contain unintended word substitutions.       Electronically signed by: ADAM Davis CNP           Sincerely,        ADAM Davis CNP

## 2024-06-11 NOTE — PROGRESS NOTES
[facility]    Chief Complaint   Patient presents with    RECHECK     HPI:  Becky England is a 84 year old  (1940), who is being seen today for an episodic care visit at: [facility]  ([facility]) [[id number]].     PMH significant for hypertension, dyslipidemia, anxiety, cognitive impairment, tobacco dependence, osteoporosis     Summary of recent hospitalization:  Patient was hospitalized at [facility] from 5/25/2024 to 5/30/2024 for femur fracture status left hemiarthroplasty on 5/26/2024.  Patient presented to the emergency department for evaluation after a fall.  Laboratory evaluation in the ED revealed essentially normal CBC and BMP.  CT head and cervical spine negative for acute findings, stable chronic superior endplate fracture at T3 with moderate anterior height loss, moderate to advanced disc degeneration throughout cervical spine, high-grade neural foraminal stenosis at C3-C4 on the left and low-grade elsewhere.  X-ray to left hip and pelvis revealed displaced subcapital femoral neck fracture of left hip.  Ortho was consulted and patient's status post left hip hemiarthroplasty on 5/26/2024.  ml. Okay to weight-bear as tolerated to left lower extremity, with posterior hip precautions.  Lovenox daily x 6 weeks for DVT prophylaxis.  With acute blood loss anemia secondary to surgery, hemoglobin 10.7 on 5/28/2024. Also with post op delirium resolved prior to discharge. Discharged to TCU for physical rehabilitation and medical management.    Today patient was seen for routine follow-up in the TCU.  Have been adjusting patient's blood pressure medications, as on admission patient on clonidine and it was causing soft Bps.  Patient denies any pain today.  She denies any shortness of breath, chest pain, dizziness/lightheadedness, dysuria/trouble urinating.  Last bowel movement was 2 days ago per nursing documentation.  Patient continues to work  with therapy.    Reviewed facility EMR including medications, recent nursing progress notes, vital signs.  Discussed plan of care with nursing.    Allergies, and PMH/PSH reviewed in EPIC today.  REVIEW OF SYSTEMS:  7 point ROS of systems including Constitutional, Respiratory, Cardiovascular, Gastroenterology, Genitourinary, Musculoskeletal, Psychiatric were all negative except for pertinent positives noted in my HPI.    Objective:   BP (!) 154/85   Pulse 76   Temp 98.4  F (36.9  C)   Resp 18   Wt 45.6 kg (100 lb 9.6 oz)   SpO2 98%   BMI 15.76 kg/m    GENERAL APPEARANCE:  Alert, in NAD  HEENT: normocephalic, moist mucous membranes, nose without drainage or crusting  RESP:  respiratory effort normal, no respiratory distress, Lung sounds clear, patient is on RA  CV: auscultation of heart done, rate and rhythm regular.   ABDOMEN: + bowel sounds, soft, nontender, no grimacing or guarding with palpation.  M/S:  no lower extremity edema  NEURO: cranial nerves 2-12 grossly intact and at patient's baseline; moves extremities freely  PSYCH: affect and mood normal      Labs done in SNF are in [facility]. Please refer to them using revoPT/Care Everywhere. and Recent labs in EPIC reviewed by me today.     Assessment/Plan:  Left femoral neck fracture status post left hip hemiarthroplasty on 5/26/2024  Osteoporosis  Fall, subsequent encounter  Patient denies any pain today  Plan: Continue Tylenol 650 mg q6h PRN for pain.  Continue enoxaparin 40 mg daily for DVT prophylaxis through 7/7/2024. CBC weekly while on enoxaparin- ordered for 6/12. Weightbearing as tolerated to left lower extremity, posterior hip flexion past 90 degrees, no hip adduction beyond neutral and no hip internal rotation.  Continue wound care as ordered.  Follow-up with Ortho as scheduled for 6/14. Follow up outpatient to discuss osteoporosis management.     Acute blood loss anemia  Secondary to surgery  Baseline hemoglobin around 13  Hemoglobin 10.8 on  6/5/2024  Plan: CBC reordered for 6/12 and 6/19 while on enoxaparin.  Monitor for signs and symptoms of bleeding.     Essential hypertension  Clonidine was reduced to once daily and PRN on 6/4 and has been receiving it once daily- has not used any PRN doses.   Started on losartan 6/4  SBP  recently- having significant drops in BP after administration of clonidine.- denies lightheadedness/ dizziness  Plan: Increase losartan to 50 mg daily. Discontinue scheduled clonidine  and continue clonidine 0.1 mg daily PRN for SBP>170. BMP 6/19. Monitor BP.     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 Monitor      Anxiety  Plan: Continue citalopram 20 mg daily and mirtazapine 15 mg at bedtime- note that this dose can be stimulating, if issues sleeping move to AM.  Monitor mood and symptoms.  Consider ACP referral as needed.     Tobacco dependence  Plan: Continue nicotine patch daily.  Encourage smoking cessation.     Delirium, improved  Dementia level cognitive impairment   Slums 16/30, CPT 4.3/5.6 in the TCU  Plan: Based on cognitive testing patient is on the border of requiring 24-hour supervision for problem-solving and managing changes in routine, recommend daily assistant with self-cares, meal prep, housekeeping, medication/financial management and community mobility.  Nursing to assist with cares, meals, medication assistance, activities.     Slow transit constipation  Last BM 2 days ago, reportedly was having diarrhea recently and requested meds to PRN  Plan: Continue senna s 1 tab BID RPN and miralax daily PRN. Monitor bowels closely.     Physical deconditioning  Secondary to recent hospitalization, surgery, acute/ chronic medical conditions as above  Patient continues to work with therapy  Plan: Encourage participation in physical therapy/occupational  therapy for strengthening and deconditioning. Discharge planning per their recommendation. Social work to assist with d/c planning.    MED REC REQUIRED  Post Medication Reconciliation Status:  Medication reconciliation previously completed during another office visit      Disclaimer: This note may contain text created using speech-recognition software and may contain unintended word substitutions.       Electronically signed by: ADAM Davis CNP

## 2024-06-12 LAB
ERYTHROCYTE [DISTWIDTH] IN BLOOD BY AUTOMATED COUNT: 13.5 % (ref 10–15)
HCT VFR BLD AUTO: 34.3 % (ref 35–47)
HGB BLD-MCNC: 10.5 G/DL (ref 11.7–15.7)
MCH RBC QN AUTO: 27.4 PG (ref 26.5–33)
MCHC RBC AUTO-ENTMCNC: 30.6 G/DL (ref 31.5–36.5)
MCV RBC AUTO: 90 FL (ref 78–100)
PLATELET # BLD AUTO: 303 10E3/UL (ref 150–450)
RBC # BLD AUTO: 3.83 10E6/UL (ref 3.8–5.2)
WBC # BLD AUTO: 4.5 10E3/UL (ref 4–11)

## 2024-06-12 PROCEDURE — 36415 COLL VENOUS BLD VENIPUNCTURE: CPT | Performed by: NURSE PRACTITIONER

## 2024-06-12 PROCEDURE — 85027 COMPLETE CBC AUTOMATED: CPT | Performed by: NURSE PRACTITIONER

## 2024-06-12 PROCEDURE — P9604 ONE-WAY ALLOW PRORATED TRIP: HCPCS | Performed by: NURSE PRACTITIONER

## 2024-06-14 RX ORDER — ASPIRIN 81 MG/1
81 TABLET ORAL 2 TIMES DAILY
COMMUNITY

## 2024-06-14 NOTE — PROGRESS NOTES
[facility]  INITIAL VISIT NOTE  June 17, 2024    PRIMARY CARE PROVIDER AND CLINIC:  Outside, Provider No address on file    CHIEF COMPLAINT:  Hospital follow-up/Initial visit    HPI:    Becky England is a 84 year old  (1940) female [facility] for an initial visit.     Medical history is notable for hypertension, dyslipidemia, syncope, nicotine dependence, BCC, YUNIER, osteoporosis, and cognitive impairment.    Summary of hospital course:  Patient was hospitalized at [facility] from May 25 through May 30, 2024 for left hip femoral neck fracture, sustained after mechanical fall.  CT head showed no acute intracranial pathology.  CT cervical spine was negative for acute fracture.  X-ray of the left hip demonstrated displaced subcapital femoral neck fracture.  She was evaluated by orthopedic service and underwent left hip hemiarthroplasty on May 26, 2024.  EBL was 200 mL and postop hemoglobin dropped to 10.7 from initial 13.4.  Hospital course was complicated by delirium.  TCU was recommended for rehab.    Patient is admitted to this facility for medical management, nursing care, and subacute rehab.     Of note, history was obtained from patient, facility staff, and extensive review of the chart including hospital admission note, consult notes, and discharge summary.    Today's visit:  Patient was seen in her room, lying in bed.  She appears frail but comfortable.  She reports no pain in her left hip.  She had a BM yesterday.  She denies fever, chills, chest pain, palpitation, dyspnea, nausea, vomiting, abdominal pain, or urinary symptoms.      CODE STATUS:   DNR / DNI    PAST MEDICAL HISTORY:   Left hip displaced femoral neck fracture, s/p left hip hemiarthroplasty on May 26, 2024  Hypertension  Dyslipidemia  Syncope  Nicotine dependence  BCC of the skin  YUNIER  Osteoporosis  Chronic T3 fracture  Cognitive impairment    PAST  "SURGICAL HISTORY:   Appendectomy  Left hip hemiarthroplasty    FAMILY HISTORY:   Family history is negative for macular degeneration, cataract, or bleeding disorder.    SOCIAL HISTORY:  Patient smokes 0.3 pack of cigarettes a day.  She does not drink alcohol.      MEDICATIONS:  Current Outpatient Medications   Medication Sig Dispense Refill    acetaminophen (TYLENOL) 325 MG tablet Take 650 mg by mouth every 6 hours      citalopram (CELEXA) 20 MG tablet Take 1 tablet by mouth daily      cloNIDine (CATAPRES) 0.1 MG tablet daily PRN for SBP>170      enoxaparin ANTICOAGULANT (LOVENOX) 40 MG/0.4ML syringe Inject 40 mg Subcutaneous every 24 hours      losartan (COZAAR) 25 MG tablet Take 2 tablets (50 mg) by mouth daily      mirtazapine (REMERON) 15 MG tablet Take 1 tablet by mouth at bedtime      nicotine (NICODERM CQ) 14 MG/24HR 24 hr patch Place 1 patch onto the skin every 24 hours Apply 1 patch transdermally every day shift for  Cigarette withdrawal symptoms Apply on dry, clean,  hairless skin      polyethylene glycol (MIRALAX) 17 GM/Dose powder Take 17 g by mouth daily      senna-docusate (SENOKOT-S/PERICOLACE) 8.6-50 MG tablet Take 1 tablet by mouth 2 times daily And 1 tab BID PRN         MED REC REQUIRED  Post Medication Reconciliation Status: medication reconcilation previously completed during another office visit      ALLERGIES:  Allergies   Allergen Reactions    Ibandronic Acid Dizziness     PN: LW Reaction: dizzy    Lisinopril Cough     PN: LW Reaction: cough       ROS:  10 point ROS were negative other than the symptoms noted above in the HPI.     PHYSICAL EXAM:  Vital signs were reviewed in the chart.  Vital Signs: /69   Pulse 96   Temp 97.8  F (36.6  C)   Resp 18   Ht 1.702 m (5' 7\")   Wt 47.1 kg (103 lb 12.8 oz)   SpO2 100%   BMI 16.26 kg/m     General: Frail appearing but comfortable and in no acute distress  HEENT: Mild conjunctival pallor, no scleral icterus or injection, moist oral " mucosa  Cardiovascular: Normal S1, S2, RRR  Respiratory: Lungs clear to auscultation bilaterally  GI: Abdomen soft, non-tender, non-distended, +BS  Extremities: No LE edema  Neuro: CX II-XII grossly intact; ROM in all four extremities grossly intact  Psych: Alert and oriented almost x3; normal affect  Skin: Left hip surgical wound has healed.  There is small swelling at the surgical site.    LABORATORY/IMAGING DATA:  All relevant labs and imaging data in [facility] and/or Care Everywhere were personally reviewed today.    Most Recent 3 CBC's:  Recent Labs   Lab Test 06/12/24  0537 06/05/24  0622   WBC 4.5 4.8   HGB 10.5* 10.8*   MCV 90 90    290     Most Recent 3 BMP's:  Recent Labs   Lab Test 06/05/24 0622      POTASSIUM 4.2   CHLORIDE 105   CO2 26   BUN 26.7*   CR 0.81   ANIONGAP 11   ROMAN 8.9   GLC 89     Most Recent 2 LFT's:No lab results found.      ASSESSMENT/PLAN:  Fall, subsequent encounter,  Left hip displaced femoral neck fracture, s/p left hip hemiarthroplasty on May 26, 2024,  Age-related osteoporosis with current pathologic fracture,  Physical deconditioning.  Patient is hemodynamically stable.  Surgical pain is fairly controlled.  Enoxaparin was discontinued on June 13 by Ortho due to small left hip hematoma/seroma, and started on aspirin instead for DVT prophylaxis.   Plan:  Fall precautions  Pain management with acetaminophen 650 mg every 6 hours as needed  DVT prophylaxis with aspirin 81 mg twice daily   WBAT to LLE  Mobilize with PT/OT  Follow-up with Ortho as directed    ABLA,  Small left hip hematoma/seroma.  Due to hip fracture/hip surgery as well as small left hip hematoma.    mL.  Last hemoglobin 10.5 on June 12.  Plan:  Monitor hemoglobin periodically (next CBC on June 19)    Hypertension.  PTA scheduled clonidine was discontinued and started on losartan in TCU.  Blood pressure is now fairly controlled.  Plan:  Continue losartan 50 mg daily  Continue clonidine 0.1 mg daily as  needed for SBP more than 170  Monitor blood pressure    Dyslipidemia.  Patient is not on medical therapy.  Plan  Follow-up as outpatient    Nicotine dependence.  Plan:  Encourage smoking cessation  Continue nicotine patch 14 mg daily    YUNIER.  Plan:  Continue mirtazapine 15 mg at bedtime  Continue citalopram 20 mg daily  Monitor symptoms    Slow transit constipation.  Plan:  Continue the bowel regimen     Cognitive impairment,  Hospital-acquired delirium.  SLUMS score 16/30 and CPT score 4.3/5.6 this TCU stay.  Today, patient is oriented almost x 3.  Plan:  Standard delirium precautions  Staff to assist with daily care and mobility        Orders written by provider at facility:  None.      Disclaimer: This note contains text created using speech-recognition software and may have unintended word substitutions.      Electronically signed by:  Oleg Murillo MD

## 2024-06-17 ENCOUNTER — TRANSITIONAL CARE UNIT VISIT (OUTPATIENT)
Dept: GERIATRICS | Facility: CLINIC | Age: 84
End: 2024-06-17
Payer: COMMERCIAL

## 2024-06-17 VITALS
HEART RATE: 96 BPM | WEIGHT: 103.8 LBS | BODY MASS INDEX: 16.29 KG/M2 | RESPIRATION RATE: 18 BRPM | SYSTOLIC BLOOD PRESSURE: 110 MMHG | TEMPERATURE: 97.8 F | HEIGHT: 67 IN | OXYGEN SATURATION: 100 % | DIASTOLIC BLOOD PRESSURE: 69 MMHG

## 2024-06-17 DIAGNOSIS — T14.8XXA HEMATOMA: ICD-10-CM

## 2024-06-17 DIAGNOSIS — K59.01 SLOW TRANSIT CONSTIPATION: ICD-10-CM

## 2024-06-17 DIAGNOSIS — I10 ESSENTIAL HYPERTENSION: ICD-10-CM

## 2024-06-17 DIAGNOSIS — D62 ACUTE BLOOD LOSS ANEMIA: ICD-10-CM

## 2024-06-17 DIAGNOSIS — E78.5 DYSLIPIDEMIA: ICD-10-CM

## 2024-06-17 DIAGNOSIS — S72.002D CLOSED FRACTURE OF NECK OF LEFT FEMUR WITH ROUTINE HEALING, SUBSEQUENT ENCOUNTER: ICD-10-CM
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
## 2024-06-17 DIAGNOSIS — R53.81 PHYSICAL DECONDITIONING: ICD-10-CM

## 2024-06-17 DIAGNOSIS — R41.89 COGNITIVE IMPAIRMENT: ICD-10-CM

## 2024-06-17 DIAGNOSIS — R41.0 DELIRIUM: ICD-10-CM

## 2024-06-17 DIAGNOSIS — M80.00XD AGE-RELATED OSTEOPOROSIS WITH CURRENT PATHOLOGICAL FRACTURE WITH ROUTINE HEALING, SUBSEQUENT ENCOUNTER: ICD-10-CM

## 2024-06-17 DIAGNOSIS — F17.218 CIGARETTE NICOTINE DEPENDENCE WITH OTHER NICOTINE-INDUCED DISORDER: ICD-10-CM

## 2024-06-17 DIAGNOSIS — F41.1 GAD (GENERALIZED ANXIETY DISORDER): ICD-10-CM

## 2024-06-17 DIAGNOSIS — Z96.649 STATUS POST HIP HEMIARTHROPLASTY: ICD-10-CM

## 2024-06-17 PROCEDURE — 99305 1ST NF CARE MODERATE MDM 35: CPT | Performed by: INTERNAL MEDICINE

## 2024-06-17 NOTE — LETTER
6/17/2024      Becky England  [address]  [place] [address]        [facility]  INITIAL VISIT NOTE  June 17, 2024    PRIMARY CARE PROVIDER AND CLINIC:  Outside, Provider No address on file    CHIEF COMPLAINT:  Hospital follow-up/Initial visit    HPI:    Becky England is a 84 year old  (1940) female who [facility] for an initial [facility].     Medical history is notable for hypertension, dyslipidemia, syncope, nicotine dependence, BCC, YUNIER, osteoporosis, and cognitive impairment.    Summary of hospital course:  Patient was hospitalized at [facility] from May 25 through May 30, 2024 for left hip femoral neck fracture, sustained after mechanical fall.  CT head showed no acute intracranial pathology.  CT cervical spine was negative for acute fracture.  X-ray of the left hip demonstrated displaced subcapital femoral neck fracture.  She was evaluated by orthopedic service and underwent left hip hemiarthroplasty on May 26, 2024.  EBL was 200 mL and postop hemoglobin dropped to 10.7 from initial 13.4.  Hospital course was complicated by delirium.  TCU was recommended for rehab.    Patient is admitted to this facility for medical management, nursing care, and subacute rehab.     Of note, history was obtained from patient, facility staff, and extensive review of the chart including hospital admission note, consult notes, and discharge summary.    Today's visit:  Patient was seen in her room, lying in bed.  She appears frail but comfortable.  She reports no pain in her left hip.  She had a BM yesterday.  She denies fever, chills, chest pain, palpitation, dyspnea, nausea, vomiting, abdominal pain, or urinary symptoms.      CODE STATUS:   DNR / DNI    PAST MEDICAL HISTORY:   Left hip displaced femoral neck fracture, s/p left hip hemiarthroplasty on May 26, 2024  Hypertension  Dyslipidemia  Syncope  Nicotine dependence  BCC of  "the skin  YUNIER  Osteoporosis  Chronic T3 fracture  Cognitive impairment    PAST SURGICAL HISTORY:   Appendectomy  Left hip hemiarthroplasty    FAMILY HISTORY:   Family history is negative for macular degeneration, cataract, or bleeding disorder.    SOCIAL HISTORY:  Patient smokes 0.3 pack of cigarettes a day.  She does not drink alcohol.      MEDICATIONS:  Current Outpatient Medications   Medication Sig Dispense Refill     acetaminophen (TYLENOL) 325 MG tablet Take 650 mg by mouth every 6 hours       citalopram (CELEXA) 20 MG tablet Take 1 tablet by mouth daily       cloNIDine (CATAPRES) 0.1 MG tablet daily PRN for SBP>170       enoxaparin ANTICOAGULANT (LOVENOX) 40 MG/0.4ML syringe Inject 40 mg Subcutaneous every 24 hours       losartan (COZAAR) 25 MG tablet Take 2 tablets (50 mg) by mouth daily       mirtazapine (REMERON) 15 MG tablet Take 1 tablet by mouth at bedtime       nicotine (NICODERM CQ) 14 MG/24HR 24 hr patch Place 1 patch onto the skin every 24 hours Apply 1 patch transdermally every day shift for  Cigarette withdrawal symptoms Apply on dry, clean,  hairless skin       polyethylene glycol (MIRALAX) 17 GM/Dose powder Take 17 g by mouth daily       senna-docusate (SENOKOT-S/PERICOLACE) 8.6-50 MG tablet Take 1 tablet by mouth 2 times daily And 1 tab BID PRN         MED REC REQUIRED  Post Medication Reconciliation Status: medication reconcilation previously completed during another office visit      ALLERGIES:  Allergies   Allergen Reactions     Ibandronic Acid Dizziness     PN: LW Reaction: dizzy     Lisinopril Cough     PN: LW Reaction: cough       ROS:  10 point ROS were negative other than the symptoms noted above in the HPI.     PHYSICAL EXAM:  Vital signs were reviewed in the chart.  Vital Signs: /69   Pulse 96   Temp 97.8  F (36.6  C)   Resp 18   Ht 1.702 m (5' 7\")   Wt 47.1 kg (103 lb 12.8 oz)   SpO2 100%   BMI 16.26 kg/m     General: Frail appearing but comfortable and in no acute " distress  HEENT: Mild conjunctival pallor, no scleral icterus or injection, moist oral mucosa  Cardiovascular: Normal S1, S2, RRR  Respiratory: Lungs clear to auscultation bilaterally  GI: Abdomen soft, non-tender, non-distended, +BS  Extremities: No LE edema  Neuro: CX II-XII grossly intact; ROM in all four extremities grossly intact  Psych: Alert and oriented almost x3; normal affect  Skin: Left hip surgical wound has healed.  There is small swelling at the surgical site.    LABORATORY/IMAGING DATA:  All relevant labs and imaging data in [facility] and/or Care Everywhere were personally reviewed today.    Most Recent 3 CBC's:  Recent Labs   Lab Test 06/12/24  0537 06/05/24  0622   WBC 4.5 4.8   HGB 10.5* 10.8*   MCV 90 90    290     Most Recent 3 BMP's:  Recent Labs   Lab Test 06/05/24  0622      POTASSIUM 4.2   CHLORIDE 105   CO2 26   BUN 26.7*   CR 0.81   ANIONGAP 11   ROMAN 8.9   GLC 89     Most Recent 2 LFT's:No lab results found.      ASSESSMENT/PLAN:  Fall, subsequent encounter,  Left hip displaced femoral neck fracture, s/p left hip hemiarthroplasty on May 26, 2024,  Age-related osteoporosis with current pathologic fracture,  Physical deconditioning.  Patient is hemodynamically stable.  Surgical pain is fairly controlled.  Enoxaparin was discontinued on June 13 by Ortho due to small left hip hematoma/seroma, and started on aspirin instead for DVT prophylaxis.   Plan:  Fall precautions  Pain management with acetaminophen 650 mg every 6 hours as needed  DVT prophylaxis with aspirin 81 mg twice daily   WBAT to LLE  Mobilize with PT/OT  Follow-up with Ortho as directed    ABLA,  Small left hip hematoma/seroma.  Due to hip fracture/hip surgery as well as small left hip hematoma.    mL.  Last hemoglobin 10.5 on June 12.  Plan:  Monitor hemoglobin periodically (next CBC on June 19)    Hypertension.  PTA scheduled clonidine was discontinued and started on losartan in TCU.  Blood pressure is now fairly  controlled.  Plan:  Continue losartan 50 mg daily  Continue clonidine 0.1 mg daily as needed for SBP more than 170  Monitor blood pressure    Dyslipidemia.  Patient is not on medical therapy.  Plan  Follow-up as outpatient    Nicotine dependence.  Plan:  Encourage smoking cessation  Continue nicotine patch 14 mg daily    YUNIER.  Plan:  Continue mirtazapine 15 mg at bedtime  Continue citalopram 20 mg daily  Monitor symptoms    Slow transit constipation.  Plan:  Continue the bowel regimen     Cognitive impairment,  Hospital-acquired delirium.  SLUMS score 16/30 and CPT score 4.3/5.6 this TCU stay.  Today, patient is oriented almost x 3.  Plan:  Standard delirium precautions  Staff to assist with daily care and mobility        Orders written by provider at facility:  None.      Disclaimer: This note contains text created using speech-recognition software and may have unintended word substitutions.      Electronically signed by:  Oleg Murillo MD                        Sincerely,        Oleg Murillo MD

## 2024-06-18 ENCOUNTER — LAB REQUISITION (OUTPATIENT)
Dept: LAB | Facility: CLINIC | Age: 84
End: 2024-06-18
Payer: COMMERCIAL

## 2024-06-18 DIAGNOSIS — D64.9 ANEMIA, UNSPECIFIED: ICD-10-CM

## 2024-06-18 DIAGNOSIS — I10 ESSENTIAL (PRIMARY) HYPERTENSION: ICD-10-CM

## 2024-06-18 NOTE — PROGRESS NOTES
[facility] GERIATRICS DISCHARGE SUMMARY  PATIENT'S NAME: Becky England  YOB: 1940  MEDICAL RECORD NUMBER:  [id number]  Place of Service where encounter took place:  [facility]  ([facility]) [[id number]]    PRIMARY CARE PROVIDER AND CLINIC RESPONSIBLE AFTER TRANSFER:   Adama Lepe MD [facility] [phone]   Transferring providers: ADAM Davis CNP, Oleg Murillo MD  Recent Hospitalization/ED:  Hospital  [facility] stay 5/25/24 to 5/30/24.  Date of SNF Admission: May 30, 2024  Date of SNF (anticipated) Discharge: June 20, 2024  Discharged to: previous [facility]  Cognitive Scores: SLUMS 16/30, CPT 4.3/5.6  Physical Function: Ambulating 450 feet with front wheel walker contact-guard assist, standby assist for bed mobility, contact-guard assist with front wheel walker for transfers, set up for grooming/hygiene, eating, min assist for upper body dressing, mod assist for lower body dressing, max assist for hygiene with toileting, contact-guard assist for transfers and clothing management with toileting      CODE STATUS/ADVANCE DIRECTIVES DISCUSSION:  No CPR- Do NOT Intubate   ALLERGIES: Ibandronic acid and Lisinopril    NURSING FACILITY COURSE   Medication Changes/Rationale:   PTA clonidine discontinued  Started on losartan for hypertension    PMH significant for hypertension, dyslipidemia, anxiety, cognitive impairment, tobacco dependence, osteoporosis     Summary of recent hospitalization:  Patient was hospitalized at [facility] from 5/25/2024 to 5/30/2024 for femur fracture status left hemiarthroplasty on 5/26/2024.  Patient presented to the emergency department for evaluation after a fall.  Laboratory evaluation in the ED revealed essentially normal CBC and BMP.  CT head and cervical spine negative for acute findings, stable chronic superior endplate fracture at T3 with moderate anterior height  loss, moderate to advanced disc degeneration throughout cervical spine, high-grade neural foraminal stenosis at C3-C4 on the left and low-grade elsewhere.  X-ray to left hip and pelvis revealed displaced subcapital femoral neck fracture of left hip.  Ortho was consulted and patient's status post left hip hemiarthroplasty on 5/26/2024.  ml. Okay to weight-bear as tolerated to left lower extremity, with posterior hip precautions.  Lovenox daily x 6 weeks for DVT prophylaxis.  With acute blood loss anemia secondary to surgery, hemoglobin 10.7 on 5/28/2024. Also with post op delirium resolved prior to discharge. Discharged to TCU for physical rehabilitation and medical management.    Summary of nursing facility stay:   Patient was seen today for discharge evaluation in the TCU.  She denies any pain.  She denies shortness of breath, chest pain, dizziness/lightheadedness, dysuria/trouble urinating.  She tells me that her bowels are move regularly.  She is looking forward to discharging.  We discussed follow-up with PCP and continue therapy through home care as below.    Specific problems addressed in the TCU:  Left femoral neck fracture status post left hip hemiarthroplasty on 5/26/2024  Osteoporosis  Fall, subsequent encounter  Patient had follow-up with Ortho on 6/13/2024, it is noted that patient developed a hematoma, previous DVT prophy with lovenox was discontinued at this appointment and changed to aspirin  Patient denies any pain  Plan: Continue Tylenol 650 mg q6h PRN for pain.  Continue aspirin 81 mg twice daily per Ortho recommendations.  Weightbearing as tolerated to left lower extremity, posterior hip flexion past 90 degrees, no hip adduction beyond neutral and no hip internal rotation..  Follow-up with Ortho 6 weeks from last visit. Follow up outpatient to discuss osteoporosis management.     Acute blood loss anemia  Hematoma  Secondary to surgery and left hip hematoma  Baseline hemoglobin around  13  Hemoglobin 10.7 on 6/19/2024- stable from previous  Per nursing hematoma looks fine  no concerns  Plan: CBC PRN.  Monitor for signs and symptoms of bleeding.     Essential hypertension  Clonidine was reduced to once daily and PRN on 6/4 and has been receiving it once daily- has not used any PRN doses.   Started on losartan 6/4  SBP  recently- denies lightheadedness/ dizziness- majority of BP <120  Plan: Reduce losartan to 25 mg daily. Monitor BP. 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 Monitor      Anxiety  Plan: Continue citalopram 20 mg daily and mirtazapine 15 mg at bedtime- note that this dose can be stimulating, if issues sleeping move to AM.  Monitor mood and symptoms.  Follow-up with PCP.     Tobacco dependence  Plan: Continue nicotine patch daily.  Encourage smoking cessation.     Delirium, improved  Dementia level cognitive impairment   [place] 16/30, CPT 4.3/5.6 in the TCU  Plan: Based on cognitive testing patient is on the border of requiring 24-hour supervision for problem-solving and managing changes in routine, recommend daily assistant with self-cares, meal prep, housekeeping, medication/financial management and community mobility.  Nursing to assist with cares, meals, medication assistance, activities.     Slow transit constipation  Bowels moving regularly per patient report  Plan: Continue senna s 1 tab BID RPN and miralax daily PRN. Monitor bowels closely.     Physical deconditioning  Secondary to recent hospitalization, surgery, acute/ chronic medical conditions as above  Completed course of therapy in the TCU  Plan: Continue therapy through home care    Discharge Medications:  MED REC REQUIRED  Post Medication Reconciliation Status:  Medication reconciliation previously  "completed during another office visit    Current Outpatient Medications   Medication Sig Dispense Refill    acetaminophen (TYLENOL) 325 MG tablet Take 650 mg by mouth every 6 hours      aspirin 81 MG EC tablet Take 81 mg by mouth 2 times daily Discuss with ortho how long this should be continued      citalopram (CELEXA) 20 MG tablet Take 1 tablet by mouth daily      losartan (COZAAR) 25 MG tablet Take 1 tablet (25 mg) by mouth daily Hold if SBP<110      mirtazapine (REMERON) 15 MG tablet Take 1 tablet by mouth at bedtime      nicotine (NICODERM CQ) 14 MG/24HR 24 hr patch Place 1 patch onto the skin every 24 hours Apply 1 patch transdermally every day shift for  Cigarette withdrawal symptoms Apply on dry, clean,  hairless skin      polyethylene glycol (MIRALAX) 17 GM/Dose powder Take 17 g by mouth daily as needed for constipation      senna-docusate (SENOKOT-S/PERICOLACE) 8.6-50 MG tablet Take 1 tablet by mouth 2 times daily as needed for constipation And 1 tab BID PRN          Past Medical History: History reviewed. No pertinent past medical history.  Physical Exam:   Vitals: /69   Pulse 84   Temp 97  F (36.1  C)   Resp 14   Ht 1.702 m (5' 7\")   Wt 46.7 kg (103 lb)   SpO2 93%   BMI 16.13 kg/m    BMI: Body mass index is 16.13 kg/m .  GENERAL APPEARANCE:  Alert, frail, in NAD  HEENT: normocephalic, moist mucous membranes, nose without drainage or crusting  RESP:  respiratory effort normal, no respiratory distress, Lung sounds clear, patient is on RA  CV: auscultation of heart done, rate and rhythm regular.   ABDOMEN: + bowel sounds, soft, nontender, no grimacing or guarding with palpation.  M/S:   no lower extremity edema  SKIN:  dressing to calf C/D/I; unable to see hip today as patient dressed and up for the day- asked nursing to update me with concerns  NEURO: moves extremities freely  PSYCH:  affect and mood normal      SNF labs: Labs done in SNF are in [place] EPIC. Please refer to them using " [facility]/Care Everywhere. and Recent labs in [facility] reviewed by me today.     DISCHARGE PLAN:  Medical Follow Up:     Follow up with primary care provider in 1 week  Follow up with specialist ortho per recommendations- 6 weeks from last visit   Discharge Services: Home Care:  Occupational Therapy, Physical Therapy, Registered Nurse, Home Health Aide, and From:  [facility]  Discharge Instructions:   Weight bearing restrictions:  WBAT to LLE, Posterior hip flexion past 90 degrees. No hip adduction beyond neutral and no hip internal rotation  Nutritional supplement of choice recommended 2 times a day.   Wound care to bilateral shins: Cleanse with Micro Klenz, apply Vaseline gauze to open area. Cover with Telfa pad. Secure with Roll gauze and tape.   Keep left hip wound open to air  Continue to monitor hematoma, update ortho if worsening  OK to shower but no bathing or soaking until approved by surgeon.   Notify your surgeon if you have increased redness, swelling, tenderness, or drainage at your incision site.   Check BP daily and keep a log to bring with to PCP office.     TOTAL DISCHARGE TIME:   Greater than 30 minutes  Electronically signed by:  ADAM Davis CNP     Home care Face to Face documentation done in [facility] attached to Home care orders for [facility].

## 2024-06-19 ENCOUNTER — DISCHARGE SUMMARY NURSING HOME (OUTPATIENT)
Dept: GERIATRICS | Facility: CLINIC | Age: 84
End: 2024-06-19
Payer: COMMERCIAL

## 2024-06-19 VITALS
DIASTOLIC BLOOD PRESSURE: 69 MMHG | RESPIRATION RATE: 14 BRPM | HEIGHT: 67 IN | HEART RATE: 84 BPM | BODY MASS INDEX: 16.17 KG/M2 | OXYGEN SATURATION: 93 % | TEMPERATURE: 97 F | SYSTOLIC BLOOD PRESSURE: 122 MMHG | WEIGHT: 103 LBS

## 2024-06-19 DIAGNOSIS — F17.200 TOBACCO DEPENDENCE: ICD-10-CM

## 2024-06-19 DIAGNOSIS — R41.89 COGNITIVE IMPAIRMENT: ICD-10-CM

## 2024-06-19 DIAGNOSIS — Z96.649 STATUS POST HIP HEMIARTHROPLASTY: ICD-10-CM

## 2024-06-19 DIAGNOSIS — S72.002D CLOSED FRACTURE OF NECK OF LEFT FEMUR WITH ROUTINE HEALING, SUBSEQUENT ENCOUNTER: Primary | ICD-10-CM

## 2024-06-19 DIAGNOSIS — I10 ESSENTIAL HYPERTENSION: ICD-10-CM
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
## 2024-06-19 DIAGNOSIS — F41.1 GAD (GENERALIZED ANXIETY DISORDER): ICD-10-CM

## 2024-06-19 DIAGNOSIS — R53.81 PHYSICAL DECONDITIONING: ICD-10-CM

## 2024-06-19 DIAGNOSIS — K59.01 SLOW TRANSIT CONSTIPATION: ICD-10-CM

## 2024-06-19 DIAGNOSIS — D62 ACUTE BLOOD LOSS ANEMIA: ICD-10-CM

## 2024-06-19 DIAGNOSIS — T14.8XXA HEMATOMA: ICD-10-CM

## 2024-06-19 LAB
ANION GAP SERPL CALCULATED.3IONS-SCNC: 10 MMOL/L (ref 7–15)
BUN SERPL-MCNC: 25.3 MG/DL (ref 8–23)
CALCIUM SERPL-MCNC: 9.1 MG/DL (ref 8.8–10.2)
CHLORIDE SERPL-SCNC: 103 MMOL/L (ref 98–107)
CREAT SERPL-MCNC: 0.88 MG/DL (ref 0.51–0.95)
DEPRECATED HCO3 PLAS-SCNC: 27 MMOL/L (ref 22–29)
EGFRCR SERPLBLD CKD-EPI 2021: 64 ML/MIN/1.73M2
ERYTHROCYTE [DISTWIDTH] IN BLOOD BY AUTOMATED COUNT: 13.7 % (ref 10–15)
GLUCOSE SERPL-MCNC: 88 MG/DL (ref 70–99)
HCT VFR BLD AUTO: 34.6 % (ref 35–47)
HGB BLD-MCNC: 10.7 G/DL (ref 11.7–15.7)
MCH RBC QN AUTO: 27.8 PG (ref 26.5–33)
MCHC RBC AUTO-ENTMCNC: 30.9 G/DL (ref 31.5–36.5)
MCV RBC AUTO: 90 FL (ref 78–100)
PLATELET # BLD AUTO: 221 10E3/UL (ref 150–450)
POTASSIUM SERPL-SCNC: 4.3 MMOL/L (ref 3.4–5.3)
RBC # BLD AUTO: 3.85 10E6/UL (ref 3.8–5.2)
SODIUM SERPL-SCNC: 140 MMOL/L (ref 135–145)
WBC # BLD AUTO: 4.5 10E3/UL (ref 4–11)

## 2024-06-19 PROCEDURE — 85014 HEMATOCRIT: CPT | Performed by: NURSE PRACTITIONER

## 2024-06-19 PROCEDURE — 99316 NF DSCHRG MGMT 30 MIN+: CPT | Performed by: NURSE PRACTITIONER

## 2024-06-19 PROCEDURE — 80048 BASIC METABOLIC PNL TOTAL CA: CPT | Performed by: NURSE PRACTITIONER

## 2024-06-19 PROCEDURE — 82374 ASSAY BLOOD CARBON DIOXIDE: CPT | Performed by: NURSE PRACTITIONER

## 2024-06-19 RX ORDER — LOSARTAN POTASSIUM 25 MG/1
25 TABLET ORAL DAILY
Status: SHIPPED
Start: 2024-06-19

## 2024-06-19 RX ORDER — POLYETHYLENE GLYCOL 3350 17 G/17G
17 POWDER, FOR SOLUTION ORAL DAILY PRN
Status: SHIPPED
Start: 2024-06-19

## 2024-06-19 NOTE — PATIENT INSTRUCTIONS
[facility] Discharge Orders    Name: Becky England  : 1940  Planned Discharge Date: 2024  Discharged to: [facility]    MEDICAL FOLLOW UP  Follow up with primary care provider in 1 week  Follow up with specialist ortho per recommendations- 6 weeks from last visit     ORDER CHANGES:  PTA clonidine discontinued  Started on losartan for hypertension    DISCHARGE MEDICATIONS:  The patient s pharmacy is authorized to dispense a 30-day supply of medications. Refill requests should be directed to the primary provider  Current Outpatient Medications   Medication Sig Dispense Refill    acetaminophen (TYLENOL) 325 MG tablet Take 650 mg by mouth every 6 hours      aspirin 81 MG EC tablet Take 81 mg by mouth 2 times daily Discuss with ortho how long this should be continued      citalopram (CELEXA) 20 MG tablet Take 1 tablet by mouth daily      losartan (COZAAR) 25 MG tablet Take 1 tablet (25 mg) by mouth daily Hold if SBP<110      mirtazapine (REMERON) 15 MG tablet Take 1 tablet by mouth at bedtime      nicotine (NICODERM CQ) 14 MG/24HR 24 hr patch Place 1 patch onto the skin every 24 hours Apply 1 patch transdermally every day shift for  Cigarette withdrawal symptoms Apply on dry, clean,  hairless skin      polyethylene glycol (MIRALAX) 17 GM/Dose powder Take 17 g by mouth daily as needed for constipation      senna-docusate (SENOKOT-S/PERICOLACE) 8.6-50 MG tablet Take 1 tablet by mouth 2 times daily as needed for constipation And 1 tab BID PRN         SERVICES:  Home Care:  Occupational Therapy, Physical Therapy, Registered Nurse, Home Health Aide, and From:  [facility] [place]    ADDITIONAL INSTRUCTIONS:  Weight bearing restrictions:  WBAT to LLE, Posterior hip flexion past 90 degrees. No hip adduction beyond neutral and no hip internal rotation  Nutritional supplement of choice recommended 2 times a day.   Wound care to bilateral shins: Cleanse with Micro Klenz, apply Vaseline gauze to open  area. Cover with Telfa pad. Secure with Roll gauze and tape.   Keep left hip wound open to air  Continue to monitor hematoma, update ortho if worsening  OK to shower but no bathing or soaking until approved by surgeon.   Notify your surgeon if you have increased redness, swelling, tenderness, or drainage at your incision site.   Check BP daily and keep a log to bring with to PCP office.     ADAM Davis CNP  This document was electronically signed on June 19, 2024

## 2024-06-19 NOTE — LETTER
6/19/2024      Becky England  [address]  [place] [address]        [facility] GERIATRICS DISCHARGE SUMMARY  PATIENT'S NAME: Becky England  YOB: 1940  MEDICAL RECORD NUMBER:  [id number]  Place of Service where encounter took place:  [facility]  ([facility]) [[id number]]    PRIMARY CARE PROVIDER AND CLINIC RESPONSIBLE AFTER TRANSFER:   Adama Lepe MD [facility] [phone]   Transferring providers: ADAM Davis CNP, Oleg Murillo MD  Recent Hospitalization/ED:  Hospital  [facility] stay 5/25/24 to 5/30/24.  Date of SNF Admission: May 30, 2024  Date of SNF (anticipated) Discharge: June 20, 2024  Discharged to: previous [facility]  Cognitive Scores: SLUMS 16/30, CPT 4.3/5.6  Physical Function: Ambulating 450 feet with front wheel walker contact-guard assist, standby assist for bed mobility, contact-guard assist with front wheel walker for transfers, set up for grooming/hygiene, eating, min assist for upper body dressing, mod assist for lower body dressing, max assist for hygiene with toileting, contact-guard assist for transfers and clothing management with toileting      CODE STATUS/ADVANCE DIRECTIVES DISCUSSION:  No CPR- Do NOT Intubate   ALLERGIES: Ibandronic acid and Lisinopril    NURSING FACILITY COURSE   Medication Changes/Rationale:   PTA clonidine discontinued  Started on losartan for hypertension    PMH significant for hypertension, dyslipidemia, anxiety, cognitive impairment, tobacco dependence, osteoporosis     Summary of recent hospitalization:  Patient was hospitalized at [facility] from 5/25/2024 to 5/30/2024 for femur fracture status left hemiarthroplasty on 5/26/2024.  Patient presented to the emergency department for evaluation after a fall.  Laboratory evaluation in the ED revealed essentially normal CBC and BMP.  CT head and cervical spine negative for acute findings,  stable chronic superior endplate fracture at T3 with moderate anterior height loss, moderate to advanced disc degeneration throughout cervical spine, high-grade neural foraminal stenosis at C3-C4 on the left and low-grade elsewhere.  X-ray to left hip and pelvis revealed displaced subcapital femoral neck fracture of left hip.  Ortho was consulted and patient's status post left hip hemiarthroplasty on 5/26/2024.  ml. Okay to weight-bear as tolerated to left lower extremity, with posterior hip precautions.  Lovenox daily x 6 weeks for DVT prophylaxis.  With acute blood loss anemia secondary to surgery, hemoglobin 10.7 on 5/28/2024. Also with post op delirium resolved prior to discharge. Discharged to TCU for physical rehabilitation and medical management.    Summary of nursing facility stay:   Patient was seen today for discharge evaluation in the TCU.  She denies any pain.  She denies shortness of breath, chest pain, dizziness/lightheadedness, dysuria/trouble urinating.  She tells me that her bowels are move regularly.  She is looking forward to discharging.  We discussed follow-up with PCP and continue therapy through home care as below.    Specific problems addressed in the TCU:  Left femoral neck fracture status post left hip hemiarthroplasty on 5/26/2024  Osteoporosis  Fall, subsequent encounter  Patient had follow-up with Ortho on 6/13/2024, it is noted that patient developed a hematoma, previous DVT prophy with lovenox was discontinued at this appointment and changed to aspirin  Patient denies any pain  Plan: Continue Tylenol 650 mg q6h PRN for pain.  Continue aspirin 81 mg twice daily per Ortho recommendations.  Weightbearing as tolerated to left lower extremity, posterior hip flexion past 90 degrees, no hip adduction beyond neutral and no hip internal rotation..  Follow-up with Ortho 6 weeks from last visit. Follow up outpatient to discuss osteoporosis management.     Acute blood loss  anemia  Hematoma  Secondary to surgery and left hip hematoma  Baseline hemoglobin around 13  Hemoglobin 10.7 on 6/19/2024- stable from previous  Plan: CBC PRN.  Monitor for signs and symptoms of bleeding.     Essential hypertension  Clonidine was reduced to once daily and PRN on 6/4 and has been receiving it once daily- has not used any PRN doses.   Started on losartan 6/4  SBP  recently- denies lightheadedness/ dizziness- majority of BP <120  Plan: Reduce losartan to 25 mg daily. Monitor BP. 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 Monitor      Anxiety  Plan: Continue citalopram 20 mg daily and mirtazapine 15 mg at bedtime- note that this dose can be stimulating, if issues sleeping move to AM.  Monitor mood and symptoms.  Follow-up with PCP.     Tobacco dependence  Plan: Continue nicotine patch daily.  Encourage smoking cessation.     Delirium, improved  Dementia level cognitive impairment   [place] 16/30, CPT 4.3/5.6 in the TCU  Plan: Based on cognitive testing patient is on the border of requiring 24-hour supervision for problem-solving and managing changes in routine, recommend daily assistant with self-cares, meal prep, housekeeping, medication/financial management and community mobility.  Nursing to assist with cares, meals, medication assistance, activities.     Slow transit constipation  Bowels moving regularly per patient report  Plan: Continue senna s 1 tab BID RPN and miralax daily PRN. Monitor bowels closely.     Physical deconditioning  Secondary to recent hospitalization, surgery, acute/ chronic medical conditions as above  Completed course of therapy in the TCU  Plan: Continue therapy through home care    Discharge Medications:  MED REC REQUIRED  Post Medication Reconciliation  "Status:  Medication reconciliation previously completed during another office visit    Current Outpatient Medications   Medication Sig Dispense Refill     acetaminophen (TYLENOL) 325 MG tablet Take 650 mg by mouth every 6 hours       aspirin 81 MG EC tablet Take 81 mg by mouth 2 times daily Discuss with ortho how long this should be continued       citalopram (CELEXA) 20 MG tablet Take 1 tablet by mouth daily       losartan (COZAAR) 25 MG tablet Take 1 tablet (25 mg) by mouth daily Hold if SBP<110       mirtazapine (REMERON) 15 MG tablet Take 1 tablet by mouth at bedtime       nicotine (NICODERM CQ) 14 MG/24HR 24 hr patch Place 1 patch onto the skin every 24 hours Apply 1 patch transdermally every day shift for  Cigarette withdrawal symptoms Apply on dry, clean,  hairless skin       polyethylene glycol (MIRALAX) 17 GM/Dose powder Take 17 g by mouth daily as needed for constipation       senna-docusate (SENOKOT-S/PERICOLACE) 8.6-50 MG tablet Take 1 tablet by mouth 2 times daily as needed for constipation And 1 tab BID PRN          Past Medical History: History reviewed. No pertinent past medical history.  Physical Exam:   Vitals: /69   Pulse 84   Temp 97  F (36.1  C)   Resp 14   Ht 1.702 m (5' 7\")   Wt 46.7 kg (103 lb)   SpO2 93%   BMI 16.13 kg/m    BMI: Body mass index is 16.13 kg/m .  GENERAL APPEARANCE:  Alert, frail, in NAD  HEENT: normocephalic, moist mucous membranes, nose without drainage or crusting  RESP:  respiratory effort normal, no respiratory distress, Lung sounds clear, patient is on RA  CV: auscultation of heart done, rate and rhythm regular.   ABDOMEN: + bowel sounds, soft, nontender, no grimacing or guarding with palpation.  M/S:   no lower extremity edema  SKIN:  dressing to calf C/D/I; unable to see hip today as patient dressed and up for the day- asked nursing to update me with concerns  NEURO: moves extremities freely  PSYCH:  affect and mood normal      SNF labs: Labs done in SNF " are in [facility]. Please refer to them using EPIC/Care Everywhere. and Recent labs in [facility] reviewed by me today.     DISCHARGE PLAN:  Medical Follow Up:     Follow up with primary care provider in 1 week  Follow up with specialist ortho per recommendations- 6 weeks from last visit   Discharge Services: Home Care:  Occupational Therapy, Physical Therapy, Registered Nurse, Home Health Aide, and From:  [facility]  Discharge Instructions:   Weight bearing restrictions:  WBAT to LLE, Posterior hip flexion past 90 degrees. No hip adduction beyond neutral and no hip internal rotation  Nutritional supplement of choice recommended 2 times a day.   Wound care to bilateral Elbows and shins wound care: Cleanse with Micro Klenz, apply Vaseline gauze to open area. Cover with Telfa pad. Secure with Roll gauze and tape.   Wound care to right hip wound:Apply lodoflex cut to fit size. Remove white mesh from one side of the dressing and place the open side down against the wound. Cover with 4x4 mepilex foam  Continue to monitor hematoma, update ortho if worsening  OK to shower but no bathing or soaking until approved by surgeon.   Notify your surgeon if you have increased redness, swelling, tenderness, or drainage at your incision site.   Check BP daily and keep a log to bring with to PCP office.     TOTAL DISCHARGE TIME:   Greater than 30 minutes  Electronically signed by:  ADAM Davis CNP     Home care Face to Face documentation done in [facility] attached to Home care orders for [facility].               Sincerely,        ADAM Davis CNP

## 2024-06-20 ENCOUNTER — HOSPITAL ENCOUNTER (EMERGENCY)
Facility: CLINIC | Age: 84
Discharge: HOME | End: 2024-06-20
Attending: SOCIAL WORKER | Admitting: SOCIAL WORKER
Payer: COMMERCIAL

## 2024-06-20 ENCOUNTER — APPOINTMENT (OUTPATIENT)
Dept: GENERAL RADIOLOGY | Facility: CLINIC | Age: 84
End: 2024-06-20
Attending: SOCIAL WORKER
Payer: COMMERCIAL

## 2024-06-20 ENCOUNTER — APPOINTMENT (OUTPATIENT)
Dept: CT IMAGING | Facility: CLINIC | Age: 84
End: 2024-06-20
Attending: EMERGENCY MEDICINE
Payer: COMMERCIAL

## 2024-06-20 ENCOUNTER — APPOINTMENT (OUTPATIENT)
Dept: GENERAL RADIOLOGY | Facility: CLINIC | Age: 84
End: 2024-06-20
Attending: EMERGENCY MEDICINE
Payer: COMMERCIAL

## 2024-06-20 VITALS
SYSTOLIC BLOOD PRESSURE: 104 MMHG | RESPIRATION RATE: 18 BRPM | TEMPERATURE: 97.5 F | HEIGHT: 66 IN | OXYGEN SATURATION: 99 % | WEIGHT: 103 LBS | BODY MASS INDEX: 16.55 KG/M2 | DIASTOLIC BLOOD PRESSURE: 59 MMHG | HEART RATE: 89 BPM

## 2024-06-20 DIAGNOSIS — S42.032A CLOSED DISPLACED FRACTURE OF ACROMIAL END OF LEFT CLAVICLE, INITIAL ENCOUNTER: ICD-10-CM

## 2024-06-20 PROCEDURE — 99284 EMERGENCY DEPT VISIT MOD MDM: CPT | Mod: 25

## 2024-06-20 PROCEDURE — 250N000013 HC RX MED GY IP 250 OP 250 PS 637: Performed by: SOCIAL WORKER

## 2024-06-20 PROCEDURE — 73000 X-RAY EXAM OF COLLAR BONE: CPT | Mod: LT

## 2024-06-20 PROCEDURE — 70450 CT HEAD/BRAIN W/O DYE: CPT

## 2024-06-20 PROCEDURE — 73030 X-RAY EXAM OF SHOULDER: CPT | Mod: LT

## 2024-06-20 RX ORDER — LIDOCAINE 4 G/G
1 PATCH TOPICAL ONCE
Status: DISCONTINUED | OUTPATIENT
Start: 2024-06-20 | End: 2024-06-20 | Stop reason: HOSPADM

## 2024-06-20 RX ORDER — ACETAMINOPHEN 500 MG
1000 TABLET ORAL EVERY 4 HOURS PRN
Status: DISCONTINUED | OUTPATIENT
Start: 2024-06-20 | End: 2024-06-20 | Stop reason: HOSPADM

## 2024-06-20 RX ADMIN — LIDOCAINE 1 PATCH: 4 PATCH TOPICAL at 14:11

## 2024-06-20 RX ADMIN — ACETAMINOPHEN 1000 MG: 500 TABLET, FILM COATED ORAL at 14:11

## 2024-06-20 ASSESSMENT — COLUMBIA-SUICIDE SEVERITY RATING SCALE - C-SSRS
6. HAVE YOU EVER DONE ANYTHING, STARTED TO DO ANYTHING, OR PREPARED TO DO ANYTHING TO END YOUR LIFE?: NO
2. HAVE YOU ACTUALLY HAD ANY THOUGHTS OF KILLING YOURSELF IN THE PAST MONTH?: NO
1. IN THE PAST MONTH, HAVE YOU WISHED YOU WERE DEAD OR WISHED YOU COULD GO TO SLEEP AND NOT WAKE UP?: NO

## 2024-06-20 ASSESSMENT — ACTIVITIES OF DAILY LIVING (ADL)
ADLS_ACUITY_SCORE: 35
ADLS_ACUITY_SCORE: 35

## 2024-06-20 NOTE — DISCHARGE INSTRUCTIONS
You were seen the emergency department after a fall and being struck by a swinging door.  Your x-ray here shows that you have a fracture of the clavicle.  This is something that you can have follow-up with orthopedic doctors, please, call the phone number I have listed to schedule an appointment in about a week.  We are giving you a sling for comfort.  You can move your shoulder gently and wear the sling at other times.    Do not put a lot of weight onto your left arm or shoulder.    If you start to have numbness or tingling in your left arm or hand, weakness dropping things, or feeling like your fingers are very cold and turning blue colors, please come back to the emergency department.

## 2024-06-20 NOTE — ED PROVIDER NOTES
"  Emergency Department Note      History of Present Illness     Chief Complaint  Fall    HPI  Alberta ALEXANDER England is a 84 year old female with a history of hypertension, hyperlipidemia, YUNIER, and nonmelanoma skin cancer who presents to the ED with her son and daughter for evaluation after a fall. The patient reports that she was leaving the TCU when the automatic door hit her walker that caused her to fall. Her children who accompany her noted that she did hit her head at the time of the injury. She denies losing consciousness. The patient denies any numbness, tingling, or weakness of her upper and lower extremities. Denies any neck pain.      Independent Historian  Son and Daughter as detailed above.    Review of External Notes  I reviewed the discharge summary from yesterday: \"Patient was hospitalized at [facility] from 5/25/2024 to 5/30/2024 for femur fracture status left hemiarthroplasty on 5/26/2024. Patient presented to the emergency department for evaluation after a fall. Laboratory evaluation in the ED revealed essentially normal CBC and BMP. CT head and cervical spine negative for acute findings, stable chronic superior endplate fracture at T3 with moderate anterior height loss, moderate to advanced disc degeneration throughout cervical spine, high-grade neural foraminal stenosis at C3-C4 on the left and low-grade elsewhere. X-ray to left hip and pelvis revealed displaced subcapital femoral neck fracture of left hip. Ortho was consulted and patient's status post left hip hemiarthroplasty on 5/26/2024.  ml. Okay to weight-bear as tolerated to left lower extremity, with posterior hip precautions. Lovenox daily x 6 weeks for DVT prophylaxis. With acute blood loss anemia secondary to surgery, hemoglobin 10.7 on 5/28/2024. Also with post op delirium resolved prior to discharge. Discharged to TCU for physical rehabilitation and medical management. \"       Past Medical History " "  Relevant Medical History and Problem List  Hypertension   Hyperlipidemia  Tobacco Use Disorder   Anxiety  Closed Subcapital Fracture, left femur  YUNIER  Nonmelanoma Skin Cancer  Nicotine Dependence   Osteopenia     Medications  Cozaar   Aspirin, 81 mg  Celexa  Remeron   Nicoderm CQ, patch   Senna    Surgical History   Cataract Removal   Appendectomy    Physical Exam   Patient Vitals for the past 24 hrs:   BP Temp Temp src Pulse Resp SpO2 Height Weight   06/20/24 1430 104/59 -- -- 89 -- 99 % -- --   06/20/24 1137 111/64 97.5  F (36.4  C) Temporal 95 18 99 % 1.676 m (5' 6\") 46.7 kg (103 lb)     Physical Exam  General: Overall stable and nontoxic appearing, cachectic.  HEENT: Conjunctivae clear, no scleral icterus, mucous membranes moist  Neuro: Sensation in the upper extremity on the left is intact and equal to the right  CV: Radial pulses are equal bilaterally  Respiratory: No signs of respiratory distress, lungs clear to auscultation bilaterally   Abdomen: Soft, without rigidity or rebound throughout  MSK: TTP over the clavicle posteriorly on the left.  No overlying skin tenting erythema or changes in color     Diagnostics   Lab Results   Labs Ordered and Resulted from Time of ED Arrival to Time of ED Departure - No data to display    Imaging  Clavicle XR, left   Final Result   IMPRESSION:       There is an acute appearing fracture of the distal left clavicle with   slightly less than one shaft width of inferior displacement of the   distal fragment relative to the proximal. Mild acromioclavicular   degenerative arthrosis. Diffuse osseous demineralization.      DC BREWER MD            SYSTEM ID:  [id number]      XR Shoulder Left G/E 3 Views   Final Result   IMPRESSION:       There is an acute appearing fracture of the distal left clavicle with   slightly less than 1 shaft width of inferior displacement of the   distal fragment relative to the proximal.       Normal acromioclavicular and glenohumeral " alignment. There is diffuse   osseous demineralization. Mild acromioclavicular degenerative   arthrosis.      DC BREWER MD            SYSTEM ID:  [id number]      CT Head w/o Contrast   Final Result   IMPRESSION: No acute intracranial pathology.       MARIBEL SCOTT MD            SYSTEM ID:  [id number]        Independent Interpretation  CT Head: No intracranial hemorrhage.  XR: Distal clavcular fracture, displaced   ED Course    Medications Administered  Medications - No data to display    Procedures  Procedures     Discussion of Management  Orthopedics, CHACHO Warren.     Social Determinants of Health adding to complexity of care  None    ED Course  ED Course as of 06/21/24 0131   Thu Jun 20, 2024   1345 I obtained history and examined the patient as noted above.    1401 I spoke with the orthopedic fellow CHACHO Warren, regarding the condition and care of the patient.    1407 I rechecked and updated the patient.      Medical Decision Making / Diagnosis   CMS Diagnoses: None    MIPS     None    MDM  Becky MUNOZ Sherri England is a 84 year old female discharged from the TCU today when she was struck by a closing automatic door and fell backwards.  No loss of consciousness.  CT head without signs of bleed.  X-ray here showed distal clavicular fracture, spoke with orthopedics, this can be placed in a sling for comfort.  No neurovascular changes distally no skin tenting or open fracture signs.  No signs of shoulder dislocation.  Patient otherwise was in her usual state of health prior to this happening.  This sounds to be very much a mechanical injury with this door striking her.  Will give information for TCU follow-up with Dr. Denise in approximately 1 week.  Return precautions were discussed with patient and family.  They were comfortable with this plan and verbalized understanding.  She was discharged in stable condition.    Disposition  The patient was discharged.     ICD-10 Codes:    ICD-10-CM    1. Closed  displaced fracture of acromial end of left clavicle, initial encounter  S42.032A            Discharge Medications  Discharge Medication List as of 6/20/2024  2:36 PM            Scribe Disclosure:  I, Courtney De Leon, am serving as a scribe at 2:15 PM on 6/20/2024 to document services personally performed by Jovita Montaño MD based on my observations and the provider's statements to me.       Jovita Montaño MD  06/21/24 0133

## 2024-06-20 NOTE — ED TRIAGE NOTES
Pt presents to the ED with complaint of fall. Pt was leaving TCU after a brief stay and the automatic door hit her, causing her to fall. Pts son saw her fall. Pt landed on her left shoulder and then hit her head after the impact of her shoulder. Pt denies LOC. Pt taking daily aspirin. Pt states her left shoulder pain is 8/10. A&O X4.

## 2024-06-21 ENCOUNTER — TRANSITIONAL CARE UNIT VISIT (OUTPATIENT)
Dept: GERIATRICS | Facility: CLINIC | Age: 84
End: 2024-06-21
Payer: COMMERCIAL

## 2024-06-21 ENCOUNTER — DOCUMENTATION ONLY (OUTPATIENT)
Dept: GERIATRICS | Facility: CLINIC | Age: 84
End: 2024-06-21
Payer: COMMERCIAL

## 2024-06-21 ENCOUNTER — DOCUMENTATION ONLY (OUTPATIENT)
Dept: GERIATRICS | Facility: CLINIC | Age: 84
End: 2024-06-21

## 2024-06-21 VITALS
TEMPERATURE: 97.9 F | SYSTOLIC BLOOD PRESSURE: 163 MMHG | OXYGEN SATURATION: 97 % | HEIGHT: 63 IN | WEIGHT: 103 LBS | DIASTOLIC BLOOD PRESSURE: 79 MMHG | RESPIRATION RATE: 18 BRPM | BODY MASS INDEX: 18.25 KG/M2 | HEART RATE: 90 BPM

## 2024-06-21 DIAGNOSIS — R41.89 COGNITIVE IMPAIRMENT: ICD-10-CM
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
## 2024-06-21 DIAGNOSIS — S72.002D CLOSED FRACTURE OF NECK OF LEFT FEMUR WITH ROUTINE HEALING, SUBSEQUENT ENCOUNTER: ICD-10-CM

## 2024-06-21 DIAGNOSIS — Z96.649 STATUS POST HIP HEMIARTHROPLASTY: ICD-10-CM

## 2024-06-21 DIAGNOSIS — I10 ESSENTIAL HYPERTENSION: ICD-10-CM

## 2024-06-21 DIAGNOSIS — F41.1 GAD (GENERALIZED ANXIETY DISORDER): ICD-10-CM

## 2024-06-21 DIAGNOSIS — K59.01 SLOW TRANSIT CONSTIPATION: ICD-10-CM

## 2024-06-21 DIAGNOSIS — S42.032D CLOSED DISPLACED FRACTURE OF ACROMIAL END OF LEFT CLAVICLE WITH ROUTINE HEALING, SUBSEQUENT ENCOUNTER: Primary | ICD-10-CM

## 2024-06-21 DIAGNOSIS — D62 ACUTE BLOOD LOSS ANEMIA: ICD-10-CM

## 2024-06-21 DIAGNOSIS — R53.81 PHYSICAL DECONDITIONING: ICD-10-CM

## 2024-06-21 DIAGNOSIS — T14.8XXA HEMATOMA: ICD-10-CM

## 2024-06-21 DIAGNOSIS — F17.200 TOBACCO DEPENDENCE: ICD-10-CM
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
## 2024-06-21 PROCEDURE — 99310 SBSQ NF CARE HIGH MDM 45: CPT | Performed by: NURSE PRACTITIONER

## 2024-06-21 RX ORDER — LIDOCAINE 4 G/G
1 PATCH TOPICAL EVERY 24 HOURS
Status: SHIPPED
Start: 2024-06-21

## 2024-06-21 NOTE — LETTER
6/21/2024      Becky England  [address]  [facility] [place] [address]        [facility]    Chief Complaint   Patient presents with     Hospital F/U     HPI:  Becky England is a 84 year old  (1940), who is being seen today for an episodic care visit at: [facility]  ([facility]) [[id number]].     PMH significant for hypertension, dyslipidemia, anxiety, cognitive impairment, tobacco dependence, osteoporosis     Summary of recent hospitalization:  Patient was hospitalized at [facility] from 5/25/2024 to 5/30/2024 for femur fracture status left hemiarthroplasty on 5/26/2024.  Patient presented to the emergency department for evaluation after a fall.  Laboratory evaluation in the ED revealed essentially normal CBC and BMP.  CT head and cervical spine negative for acute findings, stable chronic superior endplate fracture at T3 with moderate anterior height loss, moderate to advanced disc degeneration throughout cervical spine, high-grade neural foraminal stenosis at C3-C4 on the left and low-grade elsewhere.  X-ray to left hip and pelvis revealed displaced subcapital femoral neck fracture of left hip.  Ortho was consulted and patient's status post left hip hemiarthroplasty on 5/26/2024.  ml. Okay to weight-bear as tolerated to left lower extremity, with posterior hip precautions.  Lovenox daily x 6 weeks for DVT prophylaxis.  With acute blood loss anemia secondary to surgery, hemoglobin 10.7 on 5/28/2024. Also with post op delirium resolved prior to discharge. [facility] to [facility] for physical rehabilitation and medical management.    Patient was discharged from [facility] on 6/20, and unfortunately when walking out with her family to her car she fell and per ER note the automatic door hit her walker and caused her to fall, she did hit her head.  She was seen in the emergency department.  X-ray to left shoulder and clavicle revealed acute appearing  "fracture of the distal left clavicle with slightly less than 1 shaft width of inferior displacement of the distal fragment relative to the proximal, mild AC degenerative arthrosis, diffuse osseous demineralization.  Head CT negative for acute intracranial pathology.  Patient to be nonweightbearing to left upper extremity.  Follow-up with Ortho.  Patient was discharged back to the TCU, as her living facility was not able to accept her back late in the day and needs to reevaluate her for readmission with the new injury.     Today patient was seen in the TCU.  She continues to have left shoulder pain at her clavicle, but reports it is better than it was yesterday.  She otherwise is doing well, denies shortness of breath, chest pain, dizziness/lightheadedness, dysuria/trouble urinating.  Her bowels have been moving regularly.  She was concerned that overnight staff was not sure how to transfer her, I discussed this with nursing staff this morning to ensure staff has details on how to safely transfer patient when she needs to get up.  Per discussion with patient, nursing staff and  patient needs updated discharge orders completed since she was readmitted.  Plan is likely for her to discharge early next week after her senior living reassesses her.    Reviewed facility EMR including medications, recent nursing progress notes, vital signs.  Discussed plan of care with nursing.    Allergies, and PMH/PSH reviewed in [facility] today.  REVIEW OF SYSTEMS:  10 point ROS of systems including Constitutional, Eyes, Respiratory, Cardiovascular, Gastroenterology, Genitourinary, Integumentary, Musculoskeletal, Psychiatric were all negative except for pertinent positives noted in my HPI.    Objective:   BP (!) 163/79   Pulse 90   Temp 97.9  F (36.6  C)   Resp 18   Ht 1.6 m (5' 3\")   Wt 46.7 kg (103 lb)   SpO2 97%   BMI 18.25 kg/m    GENERAL APPEARANCE:  Alert, frail, in NAD  HEENT: normocephalic, moist mucous membranes, " nose without drainage or crusting  RESP:  respiratory effort normal, no respiratory distress, Lung sounds clear, patient is on RA  CV: auscultation of heart done, rate and rhythm regular.  ABDOMEN: + bowel sounds, soft, nontender, no grimacing or guarding with palpation.  M/S:  left upper extremity in sling, no swelling to hand, no leg swelling  NEURO: normal speech, moves extremities freely  PSYCH:  affect and mood normal      Labs done in SNF are in [facility]. Please refer to them using [facility]/Care Everywhere. and Recent labs in [facility] reviewed by me today.     Assessment/Plan:  Left distal clavicle fracture  Fall, subsequent encounter  Seen in ED on 6/20 and NWB to LUE  Pain is controlled   Plan: Continue scheduled tylenol and lidocaine patch. NWB to LUE. Continue sling- hygiene checks q shift. Follow up with ortho-Dr. Denise.    Left femoral neck fracture status post left hip hemiarthroplasty on 5/26/2024  Osteoporosis  Fall, subsequent encounter  Patient had follow-up with Ortho on 6/13/2024, it is noted that patient developed a hematoma, previous DVT prophy with lovenox was discontinued at this appointment and changed to aspirin  Denies any hip pain  Plan: Continue scheduled tylenol.  Continue aspirin 81 mg twice daily per Ortho recommendations.  Weightbearing as tolerated to left lower extremity, posterior hip flexion past 90 degrees, no hip adduction beyond neutral and no hip internal rotation..  Follow-up with Ortho 6 weeks from last visit. Follow up outpatient to discuss osteoporosis management.     Acute blood loss anemia  Hematoma  Secondary to surgery and left hip hematoma  Baseline hemoglobin around 13  Hemoglobin 10.7 on 6/19/2024- stable from previous  Did not see hematoma today  Plan: CBC 6/24 to ensure stability.  Monitor for signs and symptoms of bleeding.     Essential hypertension  Clonidine was reduced to once daily and PRN on 6/4 and has been receiving it once daily- has not used any PRN  doses.   Started on losartan 6/4, was increased to 50 mg daily, but then held due to soft Bps, so reduced back to 25 mg daily on 6/19  SBP  since return to the TCU  Plan: Continue losartan 25 mg daily with hold parameters. Monitor BP.      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 Monitor      Anxiety  Plan: Continue citalopram 20 mg daily and mirtazapine 15 mg at bedtime- note that this dose can be stimulating, if issues sleeping move to AM.  Monitor mood and symptoms.       Tobacco dependence  Plan: Continue nicotine patch daily.  Encourage smoking cessation.     Delirium, improved  Dementia level cognitive impairment   Timmy 16/30, CPT 4.3/5.6 in the TCU  Plan: Based on cognitive testing patient is on the border of requiring 24-hour supervision for problem-solving and managing changes in routine, recommend daily assistant with self-cares, meal prep, housekeeping, medication/financial management and community mobility.  Nursing to assist with cares, meals, medication assistance, activities.     Slow transit constipation  Bowels are moving regularly  Plan: Continue senna s 1 tab BID RPN and miralax daily PRN. Monitor bowels closely.     Physical deconditioning  Secondary to recent hospitalization, surgery, acute/ chronic medical conditions as above  Therapy was restarted upon return to the facility, will start therapy through home care when she discharges.   Plan: Encourage participation in physical therapy/occupational therapy for strengthening and deconditioning. Discharge planning per their recommendation. Social work to assist with d/c planning.      Completed updated discharge orders today per facility request       Total time spent with patient visit at the skilled nursing facility was 46 minutes including patient visit, review of  past records, review of readmission orders, order clarification, discussion with nursing and , completion of new discharge orders for anticipated discharge next week.      MED REC REQUIRED  Post Medication Reconciliation Status:  Discharge medications reconciled and changed, see notes/orders    Disclaimer: This note may contain text created using speech-recognition software and may contain unintended word substitutions.       Electronically signed by: ADAM Davis CNP     Home care Face to Face documentation done in EPIC attached to Home care orders for [facility].       Sincerely,        ADAM Davis CNP

## 2024-06-21 NOTE — PATIENT INSTRUCTIONS
[facility] Discharge Orders    Name: Becky England  : 1940  Planned Discharge Date: TBD  Discharged to: [facility]    MEDICAL FOLLOW UP  Follow up with primary care provider in 1 week  Follow up with specialist ortho per recommendations- 6 weeks from last visit   Follow up with Dr. Lalit Denise at [facility] for clavicle fracture    ORDER CHANGES:  PTA clonidine discontinued  Started on losartan for hypertension  Started on lidocaine patch for pain management     DISCHARGE MEDICATIONS:  The patient s pharmacy is authorized to dispense a 30-day supply of medications. Refill requests should be directed to the primary provider    Current Outpatient Medications   Medication Sig Dispense Refill    acetaminophen (TYLENOL) 325 MG tablet Take 650 mg by mouth 3 times daily And overnight PRN if needed      aspirin 81 MG EC tablet Take 81 mg by mouth 2 times daily Discuss with ortho how long this should be continued      citalopram (CELEXA) 20 MG tablet Take 1 tablet by mouth daily      Lidocaine (LIDOCARE) 4 % Patch Place 1 patch onto the skin every 24 hours To prevent lidocaine toxicity, patient should be patch free for 12 hrs daily.      losartan (COZAAR) 25 MG tablet Take 1 tablet (25 mg) by mouth daily Hold if SBP<110      mirtazapine (REMERON) 15 MG tablet Take 1 tablet by mouth at bedtime      nicotine (NICODERM CQ) 14 MG/24HR 24 hr patch Place 1 patch onto the skin every 24 hours Apply 1 patch transdermally every day shift for  Cigarette withdrawal symptoms Apply on dry, clean,  hairless skin      polyethylene glycol (MIRALAX) 17 GM/Dose powder Take 17 g by mouth daily as needed for constipation      senna-docusate (SENOKOT-S/PERICOLACE) 8.6-50 MG tablet Take 1 tablet by mouth 2 times daily as needed for constipation And 1 tab BID PRN         SERVICES:  Home Care:  Occupational Therapy, Physical Therapy, Registered Nurse, and Home Health Aide from [facility]    ADDITIONAL  INSTRUCTIONS:  Left upper extremity non weight bearing. Keep in sling at all times- check skin BID for breakdown  Weight bearing restrictions:  WBAT to LLE, Posterior hip flexion past 90 degrees. No hip adduction beyond neutral and no hip internal rotation  Nutritional supplement of choice recommended 2 times a day.   Wound care to bilateral shins: Cleanse with Micro Klenz, apply Vaseline gauze to open area. Cover with Telfa pad. Secure with Roll gauze and tape.   Keep left hip wound open to air  Continue to monitor hematoma, update ortho if worsening  OK to shower but no bathing or soaking until approved by surgeon.   Notify your surgeon if you have increased redness, swelling, tenderness, or drainage at your incision site.   Check BP daily and keep a log to bring with to PCP office.     ADAM Davis CNP  This document was electronically signed on June 21, 2024

## 2024-06-21 NOTE — PROGRESS NOTES
[facility] GERIATRICS    Chief Complaint   Patient presents with    Hospital F/U     HPI:  Becky England is a 84 year old  (1940), who is being seen today for an episodic care visit at: [facility]  ([facility]) [[id number]].     PMH significant for hypertension, dyslipidemia, anxiety, cognitive impairment, tobacco dependence, osteoporosis     Summary of recent hospitalization:  Patient was hospitalized at [facility] from 5/25/2024 to 5/30/2024 for femur fracture status left hemiarthroplasty on 5/26/2024.  Patient presented to the emergency department for evaluation after a fall.  Laboratory evaluation in the ED revealed essentially normal CBC and BMP.  CT head and cervical spine negative for acute findings, stable chronic superior endplate fracture at T3 with moderate anterior height loss, moderate to advanced disc degeneration throughout cervical spine, high-grade neural foraminal stenosis at C3-C4 on the left and low-grade elsewhere.  X-ray to left hip and pelvis revealed displaced subcapital femoral neck fracture of left hip.  Ortho was consulted and patient's status post left hip hemiarthroplasty on 5/26/2024.  ml. Okay to weight-bear as tolerated to left lower extremity, with posterior hip precautions.  Lovenox daily x 6 weeks for DVT prophylaxis.  With acute blood loss anemia secondary to surgery, hemoglobin 10.7 on 5/28/2024. Also with post op delirium resolved prior to discharge. [facility] for [facility] and medical management.    [facility] was discharged from [facility] on 6/20, and unfortunately when walking out with her family to her car she fell and per ER note the automatic door hit her walker and caused her to fall, she did hit her head.  She was seen in the emergency department.  X-ray to left shoulder and clavicle revealed acute appearing fracture of the distal left clavicle with slightly less than 1 shaft width of inferior  "displacement of the distal fragment relative to the proximal, mild AC degenerative arthrosis, diffuse osseous demineralization.  Head CT negative for acute intracranial pathology.  Patient to be nonweightbearing to left upper extremity.  Follow-up with Ortho.  Patient was discharged back to the TCU, as her living facility was not able to accept her back late in the day and needs to reevaluate her for readmission with the new injury.     Today patient was seen in the TCU.  She continues to have left shoulder pain at her clavicle, but reports it is better than it was yesterday.  She otherwise is doing well, denies shortness of breath, chest pain, dizziness/lightheadedness, dysuria/trouble urinating.  Her bowels have been moving regularly.  She was concerned that overnight staff was not sure how to transfer her, I discussed this with nursing staff this morning to ensure staff has details on how to safely transfer patient when she needs to get up.  Per discussion with patient, nursing staff and  patient needs updated discharge orders completed since she was readmitted.  Plan is likely for her to discharge early next week after her senior living reassesses her.    Reviewed facility EMR including medications, recent nursing progress notes, vital signs.  Discussed plan of care with nursing.    Allergies, and PMH/PSH reviewed in EPIC today.  REVIEW OF SYSTEMS:  10 point ROS of systems including Constitutional, Eyes, Respiratory, Cardiovascular, Gastroenterology, Genitourinary, Integumentary, Musculoskeletal, Psychiatric were all negative except for pertinent positives noted in my HPI.    Objective:   BP (!) 163/79   Pulse 90   Temp 97.9  F (36.6  C)   Resp 18   Ht 1.6 m (5' 3\")   Wt 46.7 kg (103 lb)   SpO2 97%   BMI 18.25 kg/m    GENERAL APPEARANCE:  Alert, frail, in NAD  HEENT: normocephalic, moist mucous membranes, nose without drainage or crusting  RESP:  respiratory effort normal, no respiratory " distress, Lung sounds clear, patient is on RA  CV: auscultation of heart done, rate and rhythm regular.  ABDOMEN: + bowel sounds, soft, nontender, no grimacing or guarding with palpation.  M/S:  left upper extremity in sling, no swelling to hand, no leg swelling  NEURO: normal speech, moves extremities freely  PSYCH:  affect and mood normal      Labs done in SNF are in [facility]. Please refer to them using [facility]/Care Everywhere. and Recent labs in [facility] reviewed by me today.     Assessment/Plan:  Left distal clavicle fracture  Fall, subsequent encounter  Seen in ED on 6/20 and NWB to LUE  Pain is controlled   Plan: Continue scheduled tylenol and lidocaine patch. NWB to LUE. Continue sling- hygiene checks q shift. Follow up with ortho-Dr. Denise.    Left femoral neck fracture status post left hip hemiarthroplasty on 5/26/2024  Osteoporosis  Fall, subsequent encounter  Patient had follow-up with Ortho on 6/13/2024, it is noted that patient developed a hematoma, previous DVT prophy with lovenox was discontinued at this appointment and changed to aspirin  Denies any hip pain  Plan: Continue scheduled tylenol.  Continue aspirin 81 mg twice daily per Ortho recommendations.  Weightbearing as tolerated to left lower extremity, posterior hip flexion past 90 degrees, no hip adduction beyond neutral and no hip internal rotation..  Follow-up with Ortho 6 weeks from last visit. Follow up outpatient to discuss osteoporosis management.     Acute blood loss anemia  Hematoma  Secondary to surgery and left hip hematoma  Baseline hemoglobin around 13  Hemoglobin 10.7 on 6/19/2024- stable from previous  Did not see hematoma today  Plan: CBC 6/24 to ensure stability.  Monitor for signs and symptoms of bleeding.     Essential hypertension  Clonidine was reduced to once daily and PRN on 6/4 and has been receiving it once daily- has not used any PRN doses.   Started on losartan 6/4, was increased to 50 mg daily, but then held due to  soft Bps, so reduced back to 25 mg daily on 6/19  SBP  since return to the TCU  Plan: Continue losartan 25 mg daily with hold parameters. Monitor BP.      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 Monitor      Anxiety  Plan: Continue citalopram 20 mg daily and mirtazapine 15 mg at bedtime- note that this dose can be stimulating, if issues sleeping move to AM.  Monitor mood and symptoms.       Tobacco dependence  Plan: Continue nicotine patch daily.  Encourage smoking cessation.     Delirium, improved  Dementia level cognitive impairment   Slums 16/30, CPT 4.3/5.6 in the TCU  Plan: Based on cognitive testing patient is on the border of requiring 24-hour supervision for problem-solving and managing changes in routine, recommend daily assistant with self-cares, meal prep, housekeeping, medication/financial management and community mobility.  Nursing to assist with cares, meals, medication assistance, activities.     Slow transit constipation  Bowels are moving regularly  Plan: Continue senna s 1 tab BID RPN and miralax daily PRN. Monitor bowels closely.     Physical deconditioning  Secondary to recent hospitalization, surgery, acute/ chronic medical conditions as above  Therapy was restarted upon return to the facility, will start therapy through home care when she discharges.   Plan: Encourage participation in physical therapy/occupational therapy for strengthening and deconditioning. Discharge planning per their recommendation. Social work to assist with d/c planning.      Completed updated discharge orders today per facility request       Total time spent with patient visit at the skilled nursing facility was 46 minutes including patient visit, review of past records, review of readmission orders, order clarification, discussion with  nursing and , completion of new discharge orders for anticipated discharge next week.      MED REC REQUIRED  Post Medication Reconciliation Status:  Discharge medications reconciled and changed, see notes/orders    Disclaimer: This note may contain text created using speech-recognition software and may contain unintended word substitutions.       Electronically signed by: ADAM Davis CNP     Home care Face to Face documentation done in EPIC attached to Home care orders for [facility].

## 2024-07-01 ENCOUNTER — DOCUMENTATION ONLY (OUTPATIENT)
Dept: GERIATRICS | Facility: CLINIC | Age: 84
End: 2024-07-01
Payer: COMMERCIAL

## 2024-07-10 ENCOUNTER — LAB REQUISITION (OUTPATIENT)
Dept: LAB | Facility: CLINIC | Age: 84
End: 2024-07-10
Payer: COMMERCIAL

## 2024-07-10 DIAGNOSIS — Z11.1 ENCOUNTER FOR SCREENING FOR RESPIRATORY TUBERCULOSIS: ICD-10-CM

## 2024-07-11 PROCEDURE — 86481 TB AG RESPONSE T-CELL SUSP: CPT | Performed by: NURSE PRACTITIONER

## 2024-07-11 PROCEDURE — P9604 ONE-WAY ALLOW PRORATED TRIP: HCPCS | Performed by: NURSE PRACTITIONER

## 2024-07-11 PROCEDURE — 36415 COLL VENOUS BLD VENIPUNCTURE: CPT | Performed by: NURSE PRACTITIONER

## 2024-07-12 ENCOUNTER — LAB REQUISITION (OUTPATIENT)
Dept: LAB | Facility: CLINIC | Age: 84
End: 2024-07-12
Payer: COMMERCIAL

## 2024-07-12 DIAGNOSIS — I10 ESSENTIAL (PRIMARY) HYPERTENSION: ICD-10-CM

## 2024-07-12 LAB
GAMMA INTERFERON BACKGROUND BLD IA-ACNC: 0.04 IU/ML
M TB IFN-G BLD-IMP: NEGATIVE
M TB IFN-G CD4+ BCKGRND COR BLD-ACNC: 2.36 IU/ML
MITOGEN IGNF BCKGRD COR BLD-ACNC: 0.03 IU/ML
MITOGEN IGNF BCKGRD COR BLD-ACNC: 0.05 IU/ML
QUANTIFERON MITOGEN: 2.4 IU/ML
QUANTIFERON NIL TUBE: 0.04 IU/ML
QUANTIFERON TB1 TUBE: 0.07 IU/ML
QUANTIFERON TB2 TUBE: 0.09

## 2024-07-15 ENCOUNTER — DOCUMENTATION ONLY (OUTPATIENT)
Dept: OTHER | Facility: CLINIC | Age: 84
End: 2024-07-15
Payer: COMMERCIAL

## 2024-07-15 LAB
ANION GAP SERPL CALCULATED.3IONS-SCNC: 12 MMOL/L (ref 7–15)
BUN SERPL-MCNC: 19.5 MG/DL (ref 8–23)
CALCIUM SERPL-MCNC: 9.7 MG/DL (ref 8.8–10.2)
CHLORIDE SERPL-SCNC: 106 MMOL/L (ref 98–107)
CREAT SERPL-MCNC: 0.68 MG/DL (ref 0.51–0.95)
DEPRECATED HCO3 PLAS-SCNC: 26 MMOL/L (ref 22–29)
EGFRCR SERPLBLD CKD-EPI 2021: 84 ML/MIN/1.73M2
ERYTHROCYTE [DISTWIDTH] IN BLOOD BY AUTOMATED COUNT: 13.9 % (ref 10–15)
GLUCOSE SERPL-MCNC: 84 MG/DL (ref 70–99)
HCT VFR BLD AUTO: 36.8 % (ref 35–47)
HGB BLD-MCNC: 11.1 G/DL (ref 11.7–15.7)
MCH RBC QN AUTO: 27.2 PG (ref 26.5–33)
MCHC RBC AUTO-ENTMCNC: 30.2 G/DL (ref 31.5–36.5)
MCV RBC AUTO: 90 FL (ref 78–100)
PLATELET # BLD AUTO: 328 10E3/UL (ref 150–450)
POTASSIUM SERPL-SCNC: 4.3 MMOL/L (ref 3.4–5.3)
RBC # BLD AUTO: 4.08 10E6/UL (ref 3.8–5.2)
SODIUM SERPL-SCNC: 144 MMOL/L (ref 135–145)
WBC # BLD AUTO: 4.7 10E3/UL (ref 4–11)

## 2024-07-15 PROCEDURE — 85014 HEMATOCRIT: CPT | Performed by: NURSE PRACTITIONER

## 2024-07-15 PROCEDURE — 36415 COLL VENOUS BLD VENIPUNCTURE: CPT | Performed by: NURSE PRACTITIONER

## 2024-07-15 PROCEDURE — P9604 ONE-WAY ALLOW PRORATED TRIP: HCPCS | Performed by: NURSE PRACTITIONER

## 2024-07-15 PROCEDURE — 80048 BASIC METABOLIC PNL TOTAL CA: CPT | Performed by: NURSE PRACTITIONER

## 2025-04-28 ENCOUNTER — LAB REQUISITION (OUTPATIENT)
Dept: LAB | Facility: CLINIC | Age: 85
End: 2025-04-28
Payer: COMMERCIAL

## 2025-04-28 DIAGNOSIS — I10 ESSENTIAL (PRIMARY) HYPERTENSION: ICD-10-CM

## 2025-04-28 DIAGNOSIS — D64.9 ANEMIA, UNSPECIFIED: ICD-10-CM

## 2025-04-29 ENCOUNTER — LAB REQUISITION (OUTPATIENT)
Dept: LAB | Facility: CLINIC | Age: 85
End: 2025-04-29
Payer: COMMERCIAL

## 2025-04-29 DIAGNOSIS — R41.0 DISORIENTATION, UNSPECIFIED: ICD-10-CM

## 2025-04-29 LAB
ALBUMIN UR-MCNC: NEGATIVE MG/DL
APPEARANCE UR: CLEAR
BILIRUB UR QL STRIP: NEGATIVE
COLOR UR AUTO: YELLOW
GLUCOSE UR STRIP-MCNC: NEGATIVE MG/DL
HGB UR QL STRIP: NEGATIVE
KETONES UR STRIP-MCNC: NEGATIVE MG/DL
LEUKOCYTE ESTERASE UR QL STRIP: NEGATIVE
MUCOUS THREADS #/AREA URNS LPF: PRESENT /LPF
NITRATE UR QL: NEGATIVE
PH UR STRIP: 5.5 [PH] (ref 5–7)
RBC URINE: 2 /HPF
SP GR UR STRIP: 1.02 (ref 1–1.03)
SQUAMOUS EPITHELIAL: <1 /HPF
UROBILINOGEN UR STRIP-MCNC: NORMAL MG/DL
WBC URINE: 2 /HPF

## 2025-04-30 LAB
ANION GAP SERPL CALCULATED.3IONS-SCNC: 13 MMOL/L (ref 7–15)
BUN SERPL-MCNC: 18.2 MG/DL (ref 8–23)
CALCIUM SERPL-MCNC: 9.5 MG/DL (ref 8.8–10.4)
CHLORIDE SERPL-SCNC: 103 MMOL/L (ref 98–107)
CREAT SERPL-MCNC: 0.87 MG/DL (ref 0.51–0.95)
EGFRCR SERPLBLD CKD-EPI 2021: 65 ML/MIN/1.73M2
ERYTHROCYTE [DISTWIDTH] IN BLOOD BY AUTOMATED COUNT: 14.4 % (ref 10–15)
GLUCOSE SERPL-MCNC: 139 MG/DL (ref 70–99)
HCO3 SERPL-SCNC: 25 MMOL/L (ref 22–29)
HCT VFR BLD AUTO: 39.3 % (ref 35–47)
HGB BLD-MCNC: 12 G/DL (ref 11.7–15.7)
MCH RBC QN AUTO: 27.1 PG (ref 26.5–33)
MCHC RBC AUTO-ENTMCNC: 30.5 G/DL (ref 31.5–36.5)
MCV RBC AUTO: 89 FL (ref 78–100)
PLATELET # BLD AUTO: 249 10E3/UL (ref 150–450)
POTASSIUM SERPL-SCNC: 3.9 MMOL/L (ref 3.4–5.3)
RBC # BLD AUTO: 4.42 10E6/UL (ref 3.8–5.2)
SODIUM SERPL-SCNC: 141 MMOL/L (ref 135–145)
WBC # BLD AUTO: 6.4 10E3/UL (ref 4–11)

## 2025-04-30 PROCEDURE — 36415 COLL VENOUS BLD VENIPUNCTURE: CPT | Mod: ORL

## 2025-04-30 PROCEDURE — 80048 BASIC METABOLIC PNL TOTAL CA: CPT | Mod: ORL

## 2025-04-30 PROCEDURE — 85027 COMPLETE CBC AUTOMATED: CPT | Mod: ORL

## 2025-04-30 PROCEDURE — P9604 ONE-WAY ALLOW PRORATED TRIP: HCPCS | Mod: ORL

## 2025-05-01 LAB — BACTERIA UR CULT: NORMAL
